# Patient Record
Sex: MALE | Race: WHITE | NOT HISPANIC OR LATINO | Employment: UNEMPLOYED | URBAN - METROPOLITAN AREA
[De-identification: names, ages, dates, MRNs, and addresses within clinical notes are randomized per-mention and may not be internally consistent; named-entity substitution may affect disease eponyms.]

---

## 2021-05-29 ENCOUNTER — RECORDS - HEALTHEAST (OUTPATIENT)
Dept: ADMINISTRATIVE | Facility: CLINIC | Age: 51
End: 2021-05-29

## 2022-12-23 ENCOUNTER — HOSPITAL ENCOUNTER (OUTPATIENT)
Facility: CLINIC | Age: 52
Setting detail: OBSERVATION
Discharge: HOME | End: 2022-12-24
Attending: EMERGENCY MEDICINE | Admitting: EMERGENCY MEDICINE
Payer: COMMERCIAL

## 2022-12-23 DIAGNOSIS — F12.10 CANNABIS ABUSE: ICD-10-CM

## 2022-12-23 DIAGNOSIS — E87.6 HYPOKALEMIA: ICD-10-CM

## 2022-12-23 DIAGNOSIS — J96.01 ACUTE RESPIRATORY FAILURE WITH HYPOXIA (H): ICD-10-CM

## 2022-12-23 DIAGNOSIS — R44.3 HALLUCINATIONS: ICD-10-CM

## 2022-12-23 PROCEDURE — 80179 DRUG ASSAY SALICYLATE: CPT | Performed by: EMERGENCY MEDICINE

## 2022-12-23 PROCEDURE — 85025 COMPLETE CBC W/AUTO DIFF WBC: CPT | Performed by: EMERGENCY MEDICINE

## 2022-12-23 PROCEDURE — 80053 COMPREHEN METABOLIC PANEL: CPT | Performed by: EMERGENCY MEDICINE

## 2022-12-23 PROCEDURE — 99285 EMERGENCY DEPT VISIT HI MDM: CPT | Mod: 25

## 2022-12-23 PROCEDURE — 93005 ELECTROCARDIOGRAM TRACING: CPT | Performed by: EMERGENCY MEDICINE

## 2022-12-23 PROCEDURE — 96365 THER/PROPH/DIAG IV INF INIT: CPT | Mod: 59

## 2022-12-23 PROCEDURE — 36415 COLL VENOUS BLD VENIPUNCTURE: CPT | Performed by: EMERGENCY MEDICINE

## 2022-12-23 PROCEDURE — 82077 ASSAY SPEC XCP UR&BREATH IA: CPT | Performed by: EMERGENCY MEDICINE

## 2022-12-23 PROCEDURE — 80143 DRUG ASSAY ACETAMINOPHEN: CPT | Performed by: EMERGENCY MEDICINE

## 2022-12-24 ENCOUNTER — APPOINTMENT (OUTPATIENT)
Dept: CT IMAGING | Facility: CLINIC | Age: 52
End: 2022-12-24
Attending: EMERGENCY MEDICINE
Payer: COMMERCIAL

## 2022-12-24 ENCOUNTER — APPOINTMENT (OUTPATIENT)
Dept: RADIOLOGY | Facility: CLINIC | Age: 52
End: 2022-12-24
Attending: EMERGENCY MEDICINE
Payer: COMMERCIAL

## 2022-12-24 VITALS
TEMPERATURE: 98 F | BODY MASS INDEX: 35.76 KG/M2 | HEART RATE: 74 BPM | DIASTOLIC BLOOD PRESSURE: 58 MMHG | SYSTOLIC BLOOD PRESSURE: 117 MMHG | RESPIRATION RATE: 18 BRPM | HEIGHT: 70 IN | OXYGEN SATURATION: 94 % | WEIGHT: 249.8 LBS

## 2022-12-24 PROBLEM — R11.2 NAUSEA WITH VOMITING: Status: ACTIVE | Noted: 2022-12-24

## 2022-12-24 PROBLEM — T40.711A: Status: ACTIVE | Noted: 2022-12-24

## 2022-12-24 PROBLEM — F19.10 SUBSTANCE ABUSE (H): Status: ACTIVE | Noted: 2022-12-24

## 2022-12-24 PROBLEM — I10 ESSENTIAL HYPERTENSION: Status: ACTIVE | Noted: 2022-12-24

## 2022-12-24 PROBLEM — F98.8 ADD (ATTENTION DEFICIT DISORDER): Status: ACTIVE | Noted: 2022-12-24

## 2022-12-24 PROBLEM — E11.9 DIABETES MELLITUS, TYPE 2 (H): Status: ACTIVE | Noted: 2022-12-24

## 2022-12-24 PROBLEM — R44.3 HALLUCINATIONS: Status: ACTIVE | Noted: 2022-12-24

## 2022-12-24 PROBLEM — J96.01 ACUTE RESPIRATORY FAILURE WITH HYPOXIA (H): Status: ACTIVE | Noted: 2022-12-24

## 2022-12-24 PROBLEM — D72.829 LEUKOCYTOSIS: Status: ACTIVE | Noted: 2022-12-24

## 2022-12-24 PROBLEM — Z87.09 HISTORY OF COPD: Status: ACTIVE | Noted: 2022-12-24

## 2022-12-24 LAB
ALBUMIN SERPL-MCNC: 3.7 G/DL (ref 3.5–5)
ALBUMIN SERPL-MCNC: 3.8 G/DL (ref 3.5–5)
ALP SERPL-CCNC: 52 U/L (ref 45–120)
ALP SERPL-CCNC: 53 U/L (ref 45–120)
ALT SERPL W P-5'-P-CCNC: 19 U/L (ref 0–45)
ALT SERPL W P-5'-P-CCNC: 20 U/L (ref 0–45)
ANION GAP SERPL CALCULATED.3IONS-SCNC: 11 MMOL/L (ref 5–18)
ANION GAP SERPL CALCULATED.3IONS-SCNC: 17 MMOL/L (ref 5–18)
APAP SERPL-MCNC: <3 UG/ML (ref 10–20)
AST SERPL W P-5'-P-CCNC: 15 U/L (ref 0–40)
AST SERPL W P-5'-P-CCNC: 17 U/L (ref 0–40)
ATRIAL RATE - MUSE: 79 BPM
BASOPHILS # BLD AUTO: 0.1 10E3/UL (ref 0–0.2)
BASOPHILS NFR BLD AUTO: 0 %
BILIRUB SERPL-MCNC: 0.2 MG/DL (ref 0–1)
BILIRUB SERPL-MCNC: 0.3 MG/DL (ref 0–1)
BUN SERPL-MCNC: 8 MG/DL (ref 8–22)
BUN SERPL-MCNC: 8 MG/DL (ref 8–22)
CALCIUM SERPL-MCNC: 8.9 MG/DL (ref 8.5–10.5)
CALCIUM SERPL-MCNC: 9 MG/DL (ref 8.5–10.5)
CHLORIDE BLD-SCNC: 91 MMOL/L (ref 98–107)
CHLORIDE BLD-SCNC: 91 MMOL/L (ref 98–107)
CO2 SERPL-SCNC: 32 MMOL/L (ref 22–31)
CO2 SERPL-SCNC: 36 MMOL/L (ref 22–31)
CREAT SERPL-MCNC: 0.75 MG/DL (ref 0.7–1.3)
CREAT SERPL-MCNC: 0.81 MG/DL (ref 0.7–1.3)
DIASTOLIC BLOOD PRESSURE - MUSE: 57 MMHG
EOSINOPHIL # BLD AUTO: 0.1 10E3/UL (ref 0–0.7)
EOSINOPHIL NFR BLD AUTO: 1 %
ERYTHROCYTE [DISTWIDTH] IN BLOOD BY AUTOMATED COUNT: 14.2 % (ref 10–15)
ERYTHROCYTE [DISTWIDTH] IN BLOOD BY AUTOMATED COUNT: 14.4 % (ref 10–15)
ETHANOL SERPL-MCNC: <10 MG/DL
FLUAV RNA SPEC QL NAA+PROBE: NEGATIVE
FLUBV RNA RESP QL NAA+PROBE: NEGATIVE
GFR SERPL CREATININE-BSD FRML MDRD: >90 ML/MIN/1.73M2
GFR SERPL CREATININE-BSD FRML MDRD: >90 ML/MIN/1.73M2
GLUCOSE BLD-MCNC: 100 MG/DL (ref 70–125)
GLUCOSE BLD-MCNC: 143 MG/DL (ref 70–125)
HCT VFR BLD AUTO: 40.6 % (ref 40–53)
HCT VFR BLD AUTO: 42.8 % (ref 40–53)
HGB BLD-MCNC: 13 G/DL (ref 13.3–17.7)
HGB BLD-MCNC: 13.6 G/DL (ref 13.3–17.7)
IMM GRANULOCYTES # BLD: 0.1 10E3/UL
IMM GRANULOCYTES NFR BLD: 1 %
INTERPRETATION ECG - MUSE: NORMAL
LYMPHOCYTES # BLD AUTO: 3.3 10E3/UL (ref 0.8–5.3)
LYMPHOCYTES NFR BLD AUTO: 27 %
MCH RBC QN AUTO: 28.7 PG (ref 26.5–33)
MCH RBC QN AUTO: 28.8 PG (ref 26.5–33)
MCHC RBC AUTO-ENTMCNC: 31.8 G/DL (ref 31.5–36.5)
MCHC RBC AUTO-ENTMCNC: 32 G/DL (ref 31.5–36.5)
MCV RBC AUTO: 90 FL (ref 78–100)
MCV RBC AUTO: 90 FL (ref 78–100)
MONOCYTES # BLD AUTO: 0.7 10E3/UL (ref 0–1.3)
MONOCYTES NFR BLD AUTO: 6 %
NEUTROPHILS # BLD AUTO: 7.6 10E3/UL (ref 1.6–8.3)
NEUTROPHILS NFR BLD AUTO: 65 %
NRBC # BLD AUTO: 0 10E3/UL
NRBC BLD AUTO-RTO: 0 /100
P AXIS - MUSE: 50 DEGREES
PLATELET # BLD AUTO: 284 10E3/UL (ref 150–450)
PLATELET # BLD AUTO: 307 10E3/UL (ref 150–450)
POTASSIUM BLD-SCNC: 2.8 MMOL/L (ref 3.5–5)
POTASSIUM BLD-SCNC: 3.9 MMOL/L (ref 3.5–5)
PR INTERVAL - MUSE: 148 MS
PROCALCITONIN SERPL-MCNC: 0.02 NG/ML (ref 0–0.49)
PROT SERPL-MCNC: 6.2 G/DL (ref 6–8)
PROT SERPL-MCNC: 6.5 G/DL (ref 6–8)
QRS DURATION - MUSE: 110 MS
QT - MUSE: 424 MS
QTC - MUSE: 486 MS
R AXIS - MUSE: 71 DEGREES
RBC # BLD AUTO: 4.51 10E6/UL (ref 4.4–5.9)
RBC # BLD AUTO: 4.74 10E6/UL (ref 4.4–5.9)
RSV RNA SPEC NAA+PROBE: NEGATIVE
SALICYLATES SERPL-MCNC: <8 MG/DL (ref 2–25)
SARS-COV-2 RNA RESP QL NAA+PROBE: NEGATIVE
SODIUM SERPL-SCNC: 138 MMOL/L (ref 136–145)
SODIUM SERPL-SCNC: 140 MMOL/L (ref 136–145)
SYSTOLIC BLOOD PRESSURE - MUSE: 125 MMHG
T AXIS - MUSE: 80 DEGREES
VENTRICULAR RATE- MUSE: 79 BPM
WBC # BLD AUTO: 11.9 10E3/UL (ref 4–11)
WBC # BLD AUTO: 12.6 10E3/UL (ref 4–11)

## 2022-12-24 PROCEDURE — 71045 X-RAY EXAM CHEST 1 VIEW: CPT

## 2022-12-24 PROCEDURE — 84145 PROCALCITONIN (PCT): CPT | Performed by: STUDENT IN AN ORGANIZED HEALTH CARE EDUCATION/TRAINING PROGRAM

## 2022-12-24 PROCEDURE — 85027 COMPLETE CBC AUTOMATED: CPT | Performed by: STUDENT IN AN ORGANIZED HEALTH CARE EDUCATION/TRAINING PROGRAM

## 2022-12-24 PROCEDURE — 96375 TX/PRO/DX INJ NEW DRUG ADDON: CPT

## 2022-12-24 PROCEDURE — 71275 CT ANGIOGRAPHY CHEST: CPT

## 2022-12-24 PROCEDURE — 250N000011 HC RX IP 250 OP 636: Performed by: EMERGENCY MEDICINE

## 2022-12-24 PROCEDURE — 80053 COMPREHEN METABOLIC PANEL: CPT | Performed by: STUDENT IN AN ORGANIZED HEALTH CARE EDUCATION/TRAINING PROGRAM

## 2022-12-24 PROCEDURE — 250N000011 HC RX IP 250 OP 636: Performed by: STUDENT IN AN ORGANIZED HEALTH CARE EDUCATION/TRAINING PROGRAM

## 2022-12-24 PROCEDURE — 250N000013 HC RX MED GY IP 250 OP 250 PS 637: Performed by: HOSPITALIST

## 2022-12-24 PROCEDURE — 99207 PR APP CREDIT; MD BILLING SHARED VISIT: CPT | Performed by: HOSPITALIST

## 2022-12-24 PROCEDURE — 250N000013 HC RX MED GY IP 250 OP 250 PS 637: Performed by: EMERGENCY MEDICINE

## 2022-12-24 PROCEDURE — G0378 HOSPITAL OBSERVATION PER HR: HCPCS

## 2022-12-24 PROCEDURE — 96366 THER/PROPH/DIAG IV INF ADDON: CPT

## 2022-12-24 PROCEDURE — C9803 HOPD COVID-19 SPEC COLLECT: HCPCS

## 2022-12-24 PROCEDURE — 96361 HYDRATE IV INFUSION ADD-ON: CPT

## 2022-12-24 PROCEDURE — 87637 SARSCOV2&INF A&B&RSV AMP PRB: CPT | Performed by: EMERGENCY MEDICINE

## 2022-12-24 PROCEDURE — 36415 COLL VENOUS BLD VENIPUNCTURE: CPT | Performed by: STUDENT IN AN ORGANIZED HEALTH CARE EDUCATION/TRAINING PROGRAM

## 2022-12-24 PROCEDURE — 258N000003 HC RX IP 258 OP 636: Performed by: STUDENT IN AN ORGANIZED HEALTH CARE EDUCATION/TRAINING PROGRAM

## 2022-12-24 PROCEDURE — 99236 HOSP IP/OBS SAME DATE HI 85: CPT | Performed by: STUDENT IN AN ORGANIZED HEALTH CARE EDUCATION/TRAINING PROGRAM

## 2022-12-24 RX ORDER — NICOTINE POLACRILEX 4 MG
15-30 LOZENGE BUCCAL
Status: DISCONTINUED | OUTPATIENT
Start: 2022-12-24 | End: 2022-12-24 | Stop reason: HOSPADM

## 2022-12-24 RX ORDER — HYDROMORPHONE HCL IN WATER/PF 6 MG/30 ML
0.2 PATIENT CONTROLLED ANALGESIA SYRINGE INTRAVENOUS ONCE
Status: COMPLETED | OUTPATIENT
Start: 2022-12-24 | End: 2022-12-24

## 2022-12-24 RX ORDER — NALOXONE HYDROCHLORIDE 0.4 MG/ML
0.2 INJECTION, SOLUTION INTRAMUSCULAR; INTRAVENOUS; SUBCUTANEOUS
Status: DISCONTINUED | OUTPATIENT
Start: 2022-12-24 | End: 2022-12-24 | Stop reason: HOSPADM

## 2022-12-24 RX ORDER — PROCHLORPERAZINE 25 MG
25 SUPPOSITORY, RECTAL RECTAL EVERY 12 HOURS PRN
Status: DISCONTINUED | OUTPATIENT
Start: 2022-12-24 | End: 2022-12-24 | Stop reason: HOSPADM

## 2022-12-24 RX ORDER — DEXTROAMPHETAMINE SULFATE, DEXTROAMPHETAMINE SACCHARATE, AMPHETAMINE SULFATE AND AMPHETAMINE ASPARTATE 7.5; 7.5; 7.5; 7.5 MG/1; MG/1; MG/1; MG/1
30 CAPSULE, EXTENDED RELEASE ORAL 2 TIMES DAILY
Status: ON HOLD | COMMUNITY
Start: 2022-12-11 | End: 2023-07-13

## 2022-12-24 RX ORDER — FLUTICASONE PROPIONATE AND SALMETEROL 50; 250 UG/1; UG/1
1 POWDER RESPIRATORY (INHALATION) 2 TIMES DAILY PRN
COMMUNITY
Start: 2022-12-05

## 2022-12-24 RX ORDER — LISINOPRIL/HYDROCHLOROTHIAZIDE 10-12.5 MG
1 TABLET ORAL DAILY
Status: DISCONTINUED | OUTPATIENT
Start: 2022-12-24 | End: 2022-12-24 | Stop reason: HOSPADM

## 2022-12-24 RX ORDER — OXYCODONE AND ACETAMINOPHEN 10; 325 MG/1; MG/1
1-2 TABLET ORAL EVERY 8 HOURS PRN
Status: DISCONTINUED | OUTPATIENT
Start: 2022-12-24 | End: 2022-12-24 | Stop reason: HOSPADM

## 2022-12-24 RX ORDER — OXYCODONE AND ACETAMINOPHEN 10; 325 MG/1; MG/1
1 TABLET ORAL EVERY 4 HOURS PRN
Status: ON HOLD | COMMUNITY
Start: 2022-12-10 | End: 2024-04-24

## 2022-12-24 RX ORDER — ONDANSETRON 4 MG/1
4 TABLET, ORALLY DISINTEGRATING ORAL EVERY 6 HOURS PRN
Status: DISCONTINUED | OUTPATIENT
Start: 2022-12-24 | End: 2022-12-24 | Stop reason: HOSPADM

## 2022-12-24 RX ORDER — LISINOPRIL/HYDROCHLOROTHIAZIDE 10-12.5 MG
1 TABLET ORAL DAILY
COMMUNITY
Start: 2022-10-23

## 2022-12-24 RX ORDER — GABAPENTIN 300 MG/1
1200 CAPSULE ORAL 3 TIMES DAILY
COMMUNITY
Start: 2022-12-09

## 2022-12-24 RX ORDER — FLUTICASONE PROPIONATE 50 MCG
2 SPRAY, SUSPENSION (ML) NASAL AT BEDTIME
COMMUNITY
Start: 2022-10-21

## 2022-12-24 RX ORDER — ONDANSETRON 2 MG/ML
4 INJECTION INTRAMUSCULAR; INTRAVENOUS EVERY 6 HOURS PRN
Status: DISCONTINUED | OUTPATIENT
Start: 2022-12-24 | End: 2022-12-24 | Stop reason: HOSPADM

## 2022-12-24 RX ORDER — VENLAFAXINE HYDROCHLORIDE 150 MG/1
150 CAPSULE, EXTENDED RELEASE ORAL AT BEDTIME
Status: DISCONTINUED | OUTPATIENT
Start: 2022-12-24 | End: 2022-12-24 | Stop reason: HOSPADM

## 2022-12-24 RX ORDER — IPRATROPIUM BROMIDE AND ALBUTEROL SULFATE 2.5; .5 MG/3ML; MG/3ML
3 SOLUTION RESPIRATORY (INHALATION) 4 TIMES DAILY PRN
Status: DISCONTINUED | OUTPATIENT
Start: 2022-12-24 | End: 2022-12-24 | Stop reason: HOSPADM

## 2022-12-24 RX ORDER — ATORVASTATIN CALCIUM 20 MG/1
20 TABLET, FILM COATED ORAL
COMMUNITY
Start: 2022-12-09

## 2022-12-24 RX ORDER — ATORVASTATIN CALCIUM 10 MG/1
20 TABLET, FILM COATED ORAL EVERY MORNING
Status: DISCONTINUED | OUTPATIENT
Start: 2022-12-24 | End: 2022-12-24 | Stop reason: HOSPADM

## 2022-12-24 RX ORDER — DEXTROSE MONOHYDRATE 25 G/50ML
25-50 INJECTION, SOLUTION INTRAVENOUS
Status: DISCONTINUED | OUTPATIENT
Start: 2022-12-24 | End: 2022-12-24 | Stop reason: HOSPADM

## 2022-12-24 RX ORDER — INSULIN GLARGINE 100 [IU]/ML
5 INJECTION, SOLUTION SUBCUTANEOUS AT BEDTIME
COMMUNITY
Start: 2022-12-09

## 2022-12-24 RX ORDER — FLUTICASONE PROPIONATE 50 MCG
2 SPRAY, SUSPENSION (ML) NASAL AT BEDTIME
Status: DISCONTINUED | OUTPATIENT
Start: 2022-12-24 | End: 2022-12-24 | Stop reason: HOSPADM

## 2022-12-24 RX ORDER — PROCHLORPERAZINE MALEATE 10 MG
10 TABLET ORAL EVERY 6 HOURS PRN
Status: DISCONTINUED | OUTPATIENT
Start: 2022-12-24 | End: 2022-12-24 | Stop reason: HOSPADM

## 2022-12-24 RX ORDER — DEXTROAMPHETAMINE SACCHARATE, AMPHETAMINE ASPARTATE MONOHYDRATE, DEXTROAMPHETAMINE SULFATE AND AMPHETAMINE SULFATE 2.5; 2.5; 2.5; 2.5 MG/1; MG/1; MG/1; MG/1
30 CAPSULE, EXTENDED RELEASE ORAL 2 TIMES DAILY
Status: DISCONTINUED | OUTPATIENT
Start: 2022-12-24 | End: 2022-12-24 | Stop reason: HOSPADM

## 2022-12-24 RX ORDER — VENLAFAXINE HYDROCHLORIDE 150 MG/1
150 CAPSULE, EXTENDED RELEASE ORAL AT BEDTIME
COMMUNITY
Start: 2022-12-13

## 2022-12-24 RX ORDER — TAMSULOSIN HYDROCHLORIDE 0.4 MG/1
0.8 CAPSULE ORAL
COMMUNITY
Start: 2022-11-04

## 2022-12-24 RX ORDER — LAMOTRIGINE 100 MG/1
100 TABLET ORAL 2 TIMES DAILY
COMMUNITY
Start: 2022-12-13

## 2022-12-24 RX ORDER — BUSPIRONE HYDROCHLORIDE 10 MG/1
10 TABLET ORAL 3 TIMES DAILY
Status: DISCONTINUED | OUTPATIENT
Start: 2022-12-24 | End: 2022-12-24 | Stop reason: HOSPADM

## 2022-12-24 RX ORDER — TAMSULOSIN HYDROCHLORIDE 0.4 MG/1
0.8 CAPSULE ORAL
Status: DISCONTINUED | OUTPATIENT
Start: 2022-12-24 | End: 2022-12-24 | Stop reason: HOSPADM

## 2022-12-24 RX ORDER — ALBUTEROL SULFATE 90 UG/1
1-2 AEROSOL, METERED RESPIRATORY (INHALATION) EVERY 4 HOURS PRN
Status: DISCONTINUED | OUTPATIENT
Start: 2022-12-24 | End: 2022-12-24 | Stop reason: HOSPADM

## 2022-12-24 RX ORDER — FLUTICASONE FUROATE AND VILANTEROL 100; 25 UG/1; UG/1
1 POWDER RESPIRATORY (INHALATION) DAILY
Status: DISCONTINUED | OUTPATIENT
Start: 2022-12-24 | End: 2022-12-24 | Stop reason: HOSPADM

## 2022-12-24 RX ORDER — LAMOTRIGINE 100 MG/1
100 TABLET ORAL 2 TIMES DAILY
Status: DISCONTINUED | OUTPATIENT
Start: 2022-12-24 | End: 2022-12-24 | Stop reason: HOSPADM

## 2022-12-24 RX ORDER — NALOXONE HYDROCHLORIDE 0.4 MG/ML
0.4 INJECTION, SOLUTION INTRAMUSCULAR; INTRAVENOUS; SUBCUTANEOUS
Status: DISCONTINUED | OUTPATIENT
Start: 2022-12-24 | End: 2022-12-24 | Stop reason: HOSPADM

## 2022-12-24 RX ORDER — SODIUM CHLORIDE 9 MG/ML
INJECTION, SOLUTION INTRAVENOUS CONTINUOUS
Status: ACTIVE | OUTPATIENT
Start: 2022-12-24 | End: 2022-12-24

## 2022-12-24 RX ORDER — POTASSIUM CHLORIDE 7.45 MG/ML
10 INJECTION INTRAVENOUS ONCE
Status: COMPLETED | OUTPATIENT
Start: 2022-12-24 | End: 2022-12-24

## 2022-12-24 RX ORDER — ALBUTEROL SULFATE 90 UG/1
1-2 AEROSOL, METERED RESPIRATORY (INHALATION) EVERY 4 HOURS PRN
COMMUNITY
Start: 2022-12-15

## 2022-12-24 RX ORDER — IOPAMIDOL 755 MG/ML
90 INJECTION, SOLUTION INTRAVASCULAR ONCE
Status: COMPLETED | OUTPATIENT
Start: 2022-12-24 | End: 2022-12-24

## 2022-12-24 RX ORDER — BUSPIRONE HYDROCHLORIDE 10 MG/1
10 TABLET ORAL 3 TIMES DAILY
Status: ON HOLD | COMMUNITY
Start: 2022-12-08 | End: 2024-04-23

## 2022-12-24 RX ORDER — POTASSIUM CHLORIDE 1.5 G/1.58G
40 POWDER, FOR SOLUTION ORAL ONCE
Status: COMPLETED | OUTPATIENT
Start: 2022-12-24 | End: 2022-12-24

## 2022-12-24 RX ADMIN — FLUTICASONE FUROATE AND VILANTEROL TRIFENATATE 1 PUFF: 100; 25 POWDER RESPIRATORY (INHALATION) at 09:46

## 2022-12-24 RX ADMIN — POTASSIUM CHLORIDE 10 MEQ: 7.46 INJECTION, SOLUTION INTRAVENOUS at 00:51

## 2022-12-24 RX ADMIN — POTASSIUM CHLORIDE 40 MEQ: 1.5 POWDER, FOR SOLUTION ORAL at 00:51

## 2022-12-24 RX ADMIN — DEXTROAMPHETAMINE SACCHARATE, AMPHETAMINE ASPARTATE MONOHYDRATE, DEXTROAMPHETAMINE SULFATE, AND AMPHETAMINE SULFATE 30 MG: 2.5; 2.5; 2.5; 2.5 CAPSULE, EXTENDED RELEASE ORAL at 09:46

## 2022-12-24 RX ADMIN — LISINOPRIL AND HYDROCHLOROTHIAZIDE 1 TABLET: 12.5; 1 TABLET ORAL at 10:29

## 2022-12-24 RX ADMIN — HYDROMORPHONE HYDROCHLORIDE 0.2 MG: 0.2 INJECTION, SOLUTION INTRAMUSCULAR; INTRAVENOUS; SUBCUTANEOUS at 04:47

## 2022-12-24 RX ADMIN — ATORVASTATIN CALCIUM 20 MG: 10 TABLET, FILM COATED ORAL at 09:46

## 2022-12-24 RX ADMIN — OXYCODONE HYDROCHLORIDE AND ACETAMINOPHEN 1 TABLET: 10; 325 TABLET ORAL at 09:45

## 2022-12-24 RX ADMIN — SODIUM CHLORIDE: 9 INJECTION, SOLUTION INTRAVENOUS at 03:30

## 2022-12-24 RX ADMIN — TAMSULOSIN HYDROCHLORIDE 0.8 MG: 0.4 CAPSULE ORAL at 11:54

## 2022-12-24 RX ADMIN — LAMOTRIGINE 100 MG: 100 TABLET ORAL at 10:29

## 2022-12-24 RX ADMIN — BUSPIRONE HYDROCHLORIDE 10 MG: 10 TABLET ORAL at 09:46

## 2022-12-24 RX ADMIN — IOPAMIDOL 90 ML: 755 INJECTION, SOLUTION INTRAVENOUS at 01:47

## 2022-12-24 ASSESSMENT — ACTIVITIES OF DAILY LIVING (ADL)
ADLS_ACUITY_SCORE: 31
ADLS_ACUITY_SCORE: 35
ADLS_ACUITY_SCORE: 35
ADLS_ACUITY_SCORE: 31

## 2022-12-24 NOTE — DISCHARGE SUMMARY
[facility]  Hospitalist Discharge Summary      Date of Admission:  12/23/2022  Date of Discharge:  12/24/2022  Discharging Provider: Ebenezer Weinstein MD  Discharge Service: Hospitalist Service    Discharge Diagnoses   Acute respiratory failure due to COPD and overdose on edible marijuana     Follow-ups Needed After Discharge   Follow-up Appointments     Follow-up and recommended labs and tests       Follow up with primary care provider, Provider Not In System, within 7   days for hospital follow- up.  No follow up labs or test are needed.             Unresulted Labs Ordered in the Past 30 Days of this Admission     No orders found for last 31 day(s).          Discharge Disposition   Discharged to home  Condition at discharge: Stable    Hospital Course      Rogelio Belle is a 52 year old male admitted on 12/23/2022. He has a past medical history of diabetes and asthma, who is admitted for acute respiratory failure with hypoxia on 2 L nasal cannula in the setting of unintentionally taking 4 times the recommended dose of marijuana edibles.      Acute respiratory failure with hypoxia (H)    Asthma    Work-up in the ER included negative chest x-ray followed by a negative CT PE study.  He does report a history of asthma.  Could also have an additional underlying pulmonary process such as sleep apnea.  Either or both could be exacerbated by his altered mentation in the setting of issue below, the overdose of cannabis.  No signs or symptoms concerning for infection. Does not appear to be on asthma medications (med rec not yet completed) presently. Vitally stable on admit.  -Continued supplemental oxygen and weaned off to RA on day of discharge  -prn duonebs   Was at baseline medical and mental status at the time of discharge.       Cannabis overdose, accidental or unintentional, initial encounter;  Hallucinations    Substance abuse (H)    Hypokalemia, nausea with emesis, Resolve with  "replacement    Leukocytosis    Appears to have been accidental per patient report and story wise.  At least 1 family member did not take, and that is his daughter who is keeping an eye on all the other family members.  Patient no longer has any hallucinations, which he reports as colorations and blobs obscuring his vision-he also noted that \"I can see time.\"  No other symptoms on admission except for the hypoxia above.  He denied having any auditory hallucinations or commands.  Elevated WBC could be from stress demarginalization. Unclear about the potassium, though perhaps he had unrecalled emesis or diarrhea in setting of altered mentation; likely was emesis as this appears to be documented earlier.   -Supportive cares  -Medium rate 75 cc/h for maintenance IV fluids, ordered for 7 hours  -Followed clinically and monitor for recurrent hallucinations  -reviewed safe dosing and if concerns for substance abuse, then would get sw  -potassium replaced    DM II  Not on meds currently  - monitored BG; hypoglycemia protocol   - diabetic diet     Chronic neck pain  Restarted on home dose prn oxycodone 10mg.       Consultations This Hospital Stay   None    Code Status   Full Code    Time Spent on this Encounter   I, Ebenezer Weinsteni MD, personally saw the patient today and spent greater than 30 minutes discharging this patient.       Ebenezer Weinstein MD  [address] [address]  Phone: [phone]  Fax: [phone]  ______________________________________________________________________    Physical Exam   Vital Signs: Temp: 98  F (36.7  C) Temp src: Oral BP: 117/58 Pulse: 74   Resp: 18 SpO2: 94 % O2 Device: Nasal cannula Oxygen Delivery: 2 LPM  Weight: 249 lbs 12.8 oz  Patient is laying down in bed and is alert and oriented and in no acute distress.  He is on room air and likes to go home.  He states he is lucid now and is not confused as before and does " not have any complaints.  HEENT is unremarkable.  Moist oral mucosal moisture  Neck is supple  Chest is clear to auscultation bilaterally.  Good air movement.  No wheezing or retraction.  Heart with regular rate and rhythm  Abdomen is benign and nontender   deferred  Extremities without edema or cyanosis or clubbing  Neurological exam is nonfocal.  Patient is alert and oriented with normal speech and thought process.       Primary Care Physician   Provider Not In System    Discharge Orders      Reason for your hospital stay    Respiratory failure and low oxygen due to overdose on edible pharmaceutical  marijuana     Follow-up and recommended labs and tests     Follow up with primary care provider, Provider Not In System, within 7 days for hospital follow- up.  No follow up labs or test are needed.     Activity    Your activity upon discharge: activity as tolerated     Diet    Follow this diet upon discharge: Orders Placed This Encounter      Moderate Consistent Carb (60 g CHO per Meal) Diet       Significant Results and Procedures   Most Recent 3 CBC's:Recent Labs   Lab Test 12/24/22  0359 12/23/22  2357   WBC 12.6* 11.9*   HGB 13.0* 13.6   MCV 90 90    307     Most Recent 3 BMP's:Recent Labs   Lab Test 12/24/22  0359 12/23/22  2357    140   POTASSIUM 3.9 2.8*   CHLORIDE 91* 91*   CO2 36* 32*   BUN 8 8   CR 0.75 0.81   ANIONGAP 11 17   AKBAR 9.0 8.9    143*     Most Recent 2 LFT's:Recent Labs   Lab Test 12/24/22  0359 12/23/22  2357   AST 15 17   ALT 19 20   ALKPHOS 52 53   BILITOTAL 0.3 0.2     Results for orders placed or performed during the hospital encounter of 12/23/22   XR Chest Port 1 View    Narrative    EXAM: XR CHEST PORT 1 VIEW  LOCATION: [facility]  DATE/TIME: 12/24/2022 12:53 AM    INDICATION: Dyspnea.  COMPARISON: 06/30/2019      Impression    IMPRESSION:     No focal airspace disease. No pleural effusion or pneumothorax.    The cardiomediastinal  silhouette is unremarkable.   CT Chest Pulmonary Embolism w Contrast    Narrative    EXAM: CT CHEST PULMONARY EMBOLISM W CONTRAST  LOCATION: [facility]  DATE/TIME: 12/24/2022 1:59 AM    INDICATION: hypoxia  COMPARISON: 11/27/2019    Impression    IMPRESSION:  1.  No evidence of pulmonary embolus to the segmental level  2.  Bilateral lower lobe Discoid atelectasis     Discharge Medications   Current Discharge Medication List      CONTINUE these medications which have NOT CHANGED    Details   ADDERALL XR 30 MG 24 hr capsule Take 30 mg by mouth 2 times daily Morning and Afternoon      ADVAIR DISKUS 250-50 MCG/ACT inhaler Inhale 1 puff into the lungs 2 times daily      atorvastatin (LIPITOR) 20 MG tablet Take 20 mg by mouth every morning      busPIRone (BUSPAR) 10 MG tablet Take 10 mg by mouth 3 times daily      fluticasone (FLONASE) 50 MCG/ACT nasal spray Spray 2 sprays into both nostrils At Bedtime      gabapentin (NEURONTIN) 300 MG capsule Take 600 mg by mouth 4 times daily      lamoTRIgine (LAMICTAL) 100 MG tablet Take 100 mg by mouth 2 times daily      LANTUS SOLOSTAR 100 UNIT/ML soln Inject 5 Units Subcutaneous At Bedtime      lisinopril-hydrochlorothiazide (ZESTORETIC) 10-12.5 MG tablet Take 1 tablet by mouth daily      metFORMIN (GLUCOPHAGE) 1000 MG tablet Take 1,000 mg by mouth 2 times daily (with meals)      oxyCODONE-acetaminophen (PERCOCET)  MG per tablet Take 1-2 tablets by mouth every 8 hours as needed      tamsulosin (FLOMAX) 0.4 MG capsule Take 0.8 mg by mouth daily (with lunch)      venlafaxine (EFFEXOR XR) 150 MG 24 hr capsule Take 150 mg by mouth At Bedtime      VENTOLIN  (90 Base) MCG/ACT inhaler Inhale 1-2 puffs into the lungs every 4 hours as needed           Allergies   No Known Allergies

## 2022-12-24 NOTE — PROVIDER NOTIFICATION
4:28 AM Paged Dr Bowling in regards to patient chronic neck pain usually taking 10mg of oxycodone. He has ordered a one time dose of IV dilaudid to manage patient pain and will have day shift follow up with the medication reconciliation.

## 2022-12-24 NOTE — PROGRESS NOTES
"PRIMARY DIAGNOSIS: \"GENERIC\" NURSING  OUTPATIENT/OBSERVATION GOALS TO BE MET BEFORE DISCHARGE:  1. ADLs back to baseline: Yes    2. Activity and level of assistance: Up with standby assistance.    3. Pain status: Pain regulated with oral pain medication.     4. Return to near baseline physical activity: Yes     Discharge Planner Nurse   Safe discharge environment identified: Yes  Barriers to discharge: Yes       Entered by: Opal Montelongo RN 12/24/2022 10:41 AM     Please review provider order for any additional goals.   Nurse to notify provider when observation goals have been met and patient is ready for discharge.  "

## 2022-12-24 NOTE — ED TRIAGE NOTES
Pt arrives via EMS. Pt was with family, took edibles. EMS states 105mg, 4x the recommended dose. States some nausea/vomtting. Dizziness. Pt states some hallucinating.

## 2022-12-24 NOTE — PHARMACY-ADMISSION MEDICATION HISTORY
Pharmacy Note - Admission Medication History    Pertinent Provider Information: N/A     ______________________________________________________________________    Prior To Admission (PTA) med list completed and updated in EMR.       PTA Med List   Medication Sig Last Dose     ADDERALL XR 30 MG 24 hr capsule Take 30 mg by mouth 2 times daily Morning and Afternoon 12/23/2022     ADVAIR DISKUS 250-50 MCG/ACT inhaler Inhale 1 puff into the lungs 2 times daily 12/23/2022 at AM, didn't bring     atorvastatin (LIPITOR) 20 MG tablet Take 20 mg by mouth every morning 12/23/2022 at AM     busPIRone (BUSPAR) 10 MG tablet Take 10 mg by mouth 3 times daily 12/23/2022     fluticasone (FLONASE) 50 MCG/ACT nasal spray Spray 2 sprays into both nostrils At Bedtime 12/22/2022 at PM, didn't bring     gabapentin (NEURONTIN) 300 MG capsule Take 600 mg by mouth 4 times daily 12/23/2022     lamoTRIgine (LAMICTAL) 100 MG tablet Take 100 mg by mouth 2 times daily 12/23/2022 at AM     LANTUS SOLOSTAR 100 UNIT/ML soln Inject 5 Units Subcutaneous At Bedtime 12/22/2022 at PM     lisinopril-hydrochlorothiazide (ZESTORETIC) 10-12.5 MG tablet Take 1 tablet by mouth daily 12/23/2022 at AM     metFORMIN (GLUCOPHAGE) 1000 MG tablet Take 1,000 mg by mouth 2 times daily (with meals) 12/23/2022     oxyCODONE-acetaminophen (PERCOCET)  MG per tablet Take 1-2 tablets by mouth every 8 hours as needed 12/23/2022     tamsulosin (FLOMAX) 0.4 MG capsule Take 0.8 mg by mouth daily (with lunch) 12/23/2022 at 1200     venlafaxine (EFFEXOR XR) 150 MG 24 hr capsule Take 150 mg by mouth At Bedtime 12/22/2022 at PM     VENTOLIN  (90 Base) MCG/ACT inhaler Inhale 1-2 puffs into the lungs every 4 hours as needed Unknown at didn't bring       Information source(s): Patient and CareEverywhere/SureScripts  Method of interview communication: in-person    Summary of Changes to PTA Med List  New: all listed  Discontinued: N/A  Changed: N/A    Patient was asked about  OTC/herbal products specifically.  PTA med list reflects this.    In the past week, patient estimated taking medication this percent of the time:  greater than 90%.    Allergies were reviewed, assessed, and updated with the patient.      Patient did not bring any medications to the hospital and can't retrieve from home. No multi-dose medications are available for use during hospital stay.     The information provided in this note is only as accurate as the sources available at the time of the update(s).    Thank you for the opportunity to participate in the care of this patient.    Nolan Rodriguez [facility]  12/24/2022 8:11 AM

## 2022-12-24 NOTE — ED PROVIDER NOTES
EMERGENCY DEPARTMENT ENCOUNTER      NAME: Rogelio Belle  AGE: 52 year old male  YOB: 1970  MRN: [id number]  EVALUATION DATE & TIME: 2022 11:27 PM    PCP: System, Provider Not In    ED PROVIDER: Becki Yanes MD    Chief Complaint   Patient presents with     Ingestion         FINAL IMPRESSION:  1. Acute respiratory failure with hypoxia (H)    2. Cannabis abuse    3. Hallucinations          ED COURSE & MEDICAL DECISION MAKIN:30 PM I met with the patient to gather history and to perform my initial exam. We discussed plans for the ED course, including diagnostic testing and treatment.      Pertinent Labs & Imaging studies reviewed. (See chart for details)    52 year old male with history of COPD, DM who presents to the Emergency Department for evaluation of altered mental status, dizziness with some nausea and vomiting after eating excessive amount of cannabis edibles at a party.  On exam appears to be hallucinating.  Concern for COVID ingestion.  Interestingly he is hypoxic to 88% on room air.  He is not typically on oxygen at home.  No respiratory distress, speaking in full sentences and lungs are clear.  He does have a history of COPD.  This may be some baseline hypoxia, compounded with his obesity, possible sleep apnea, and overdose.  He denies having any aspiration, but with his ingestion is not a good historian will obtain a chest x-ray to evaluate for potential aspiration given emesis.  Symptomatic anemia, arrhythmia, pulmonary edema on the differential.  My concern for ACS, PE is low.    Patient placed on monitor, supplemental oxygen, IV established and blood obtained.  Twelve-lead EKG shows sinus rhythm without ischemic changes.   CBC, CMP, aspirin, Tylenol, blood alcohol level, COVID/influenza/RSV swab and CXR pending at the time of dictation.  Patient signed out to Dr. Magaña awaiting results of same.            Medical Decision Making    History:    Supplemental history from:  Documented in HPI, if applicable    External Record(s) reviewed: Documented in HPI, if applicable.    Work Up:    Chart documentation includes differential considered and any EKGs or imaging independently interpreted by provider.    In additional to work up documented, I considered the following work up: See chart documentation, if applicable.    External consultation:    Discussion of management with another provider: See chart documentation, if applicable    Complicating factors:    Care impacted by chronic illness: Chronic Lung Disease and Diabetes    Care affected by social determinants of health: Alcohol Abuse and/or Recreational Drug Use    Disposition considerations: Admission considered. Patient was signed out to the oncoming physician, disposition pending.        At the conclusion of the encounter I discussed the results of all of the tests and the disposition. The questions were answered. The patient or family acknowledged understanding and was agreeable with the care plan.    CRITICAL CARE:  Critical Care  Performed by: Becki Yanes MD  Authorized by: Becki Yanes MD     Total critical care time: 35 minutes  Criticalcare time was exclusive of separately billable procedures and treating other patients.  Critical care was necessary to treat or prevent imminent or life-threatening deterioration of the following conditions: Acute hypoxic respiratory failure  Critical care was time spent personally by me on the following activities: development of treatment plan with patient or surrogate, discussions with consultants, examination of patient, evaluation of patient's response totreatment, obtaining history from patient or surrogate, ordering and performing treatments and interventions, ordering and review of laboratory studies, ordering and review of radiographic studies and re-evaluation ofpatient's condition, this excludes any separately billable procedures.      MEDICATIONS GIVEN IN THE  "EMERGENCY:  Medications - No data to display    NEW PRESCRIPTIONS STARTED AT TODAY'S ER VISIT  New Prescriptions    No medications on file          =================================================================    HPI    HPI is severely limited due to patient intoxication.    Patient information was obtained from: ED Nurse and Patient     Use of Intrepreter: N/A     Rogelio Belle is a 52 year old male with no pertinent medical history on file who presents to the ED for evaluation of intoxication.    Per EMS, patient arrives via after taking marijuana edibles with his family. EMS reported the patient took approximately 105mg, 4x the recommended dose.     Patient endorses taking one marijuana edible prior to arrival and states \"I'm here because I got really fucked up.\" He endorses hallucinations, vomiting, light-headedness, and dizziness, but denies any nausea. He denies smoking, shooting, or snorting any drugs. He denies normally being on any supplemental oxygen. He endorses a PMH of DM and taking pills and Insulin for his DM daily. He endorses taking other medications daily as well, but notes that he currently cannot remember which medications he takes.     REVIEW OF SYSTEMS  ROS is limited due to patient intoxication.      PAST MEDICAL HISTORY:  Past Medical History:   Diagnosis Date     COPD (chronic obstructive pulmonary disease) (H)      Diabetes (H)        PAST SURGICAL HISTORY:  History reviewed. No pertinent surgical history.    CURRENT MEDICATIONS:    None       ALLERGIES:  No Known Allergies    FAMILY HISTORY:  No family history on file.    SOCIAL HISTORY:        VITALS:  Patient Vitals for the past 24 hrs:   BP Temp Temp src Pulse Resp SpO2 Height Weight   12/23/22 2329 -- -- -- 85 -- 91 % -- --   12/23/22 2328 125/57 97.6  F (36.4  C) Oral 85 16 (!) 88 % 1.778 m (5' 10\") 129.7 kg (286 lb)       PHYSICAL EXAM    General Appearance: Patient is alert and knows he is in a hospital, but he does not know " which hospital he is in. Well-appearing, well-nourished  Head:  Normocephalic, atraumatic  Eyes:  PERRL, conjunctiva/corneas clear, EOM's intact  ENT:   membranes are moist without pallor  Neck:  Supple  Chest:  No tenderness or deformity, no crepitus  Cardio:  Regular rate and rhythm, no murmur/gallop/rub, 2+ pulses symmetric in all extremities  Pulm:  Patient is hypoxic to 88% on room air, but no respiratory distress. Clear to auscultation bilaterally  Back:  No CVA tenderness, normal ROM  Abdomen:  Soft, non-tender, non distended,no rebound or guarding.  Obese  Extremities: Moves all extremities normally, normal gait.  No peripheral edema  Skin:  Skin warm, dry, no rashes  Neuro:  Alert and oriented ×3, moving all extremities, no gross sensory defects  Psychologic: Patient is actively hallucinating and looking around the room.       RADIOLOGY/LABS:  Reviewed all pertinent imaging. Please see official radiology report. All pertinent labs reviewed and interpreted.         EKG:  Sinus rhythm rate 79.  No notable ST or T wave abnormalities.  Normal intervals with QRS of 110, QTc 486.  No previous EKG with which to compare.  I have independently reviewed and interpreted the EKG(s) documented above.      The creation of this record is based on the scribe s observations of the work being performed by Becki Yanes MD and the provider s statements to them. It was created on her behalf by Sumit Beckett, a trained medical scribe. This document has been checked and approved by the attending provider.    Becki Yanes MD  Emergency Medicine  [facility] EMERGENCY ROOM  [address] [id number] [phone]  Dept: [phone]       Becki Yanes MD  12/24/22 0020

## 2022-12-24 NOTE — PROGRESS NOTES
Patient has been assessed for Home Oxygen needs.     Pulse oximetry (SpO2) and Oxygen flow readings:    SpO2 = 91% on room air at rest while awake.    SpO2 = 88% on room air during activity/with exercise.      Pt able to walk and talk with nurse during entire 6 minute walk test. No sign of SHINE or tachypnea. No need for O2 during test as pt has history of COPD.

## 2022-12-24 NOTE — PROGRESS NOTES
Pt discharged. AVS education and instructions reviewed with the patient. All questions were answered and pt verbalized understanding. Iv's removed. Pt's belongings gathered and pt is to take them home. His wife is to take him home.

## 2022-12-24 NOTE — PLAN OF CARE
"Goal Outcome Evaluation:         Problem: Plan of Care - These are the overarching goals to be used throughout the patient stay.    Goal: Plan of Care Review  Description: The Plan of Care Review/Shift note should be completed every shift.  The Outcome Evaluation is a brief statement about your assessment that the patient is improving, declining, or no change.  This information will be displayed automatically on your shift note.  12/24/2022 1209 by Opal Montelongo RN  Outcome: Adequate for Care Transition  12/24/2022 1209 by Opal Montelongo RN  Outcome: Adequate for Care Transition  Goal: Patient-Specific Goal (Individualized)  Description: You can add care plan individualizations to a care plan. Examples of Individualization might be:  \"Parent requests to be called daily at 9am for status\", \"I have a hard time hearing out of my right ear\", or \"Do not touch me to wake me up as it startles me\".  12/24/2022 1209 by Opal Montelongo RN  Outcome: Adequate for Care Transition  12/24/2022 1209 by Opal Montelongo RN  Outcome: Adequate for Care Transition  Goal: Absence of Hospital-Acquired Illness or Injury  12/24/2022 1209 by Opal Montelongo RN  Outcome: Adequate for Care Transition  12/24/2022 1209 by Opal Montelongo RN  Outcome: Adequate for Care Transition  Intervention: Identify and Manage Fall Risk  Recent Flowsheet Documentation  Taken 12/24/2022 1000 by Opal Montelongo RN  Safety Promotion/Fall Prevention:   activity supervised   assistive device/personal items within reach   bed alarm on   increased rounding and observation   increase visualization of patient   nonskid shoes/slippers when out of bed   patient and family education   room near nurse's station   room door open   room organization consistent   safety round/check completed  Goal: Optimal Comfort and Wellbeing  12/24/2022 1209 by Opal Montelongo RN  Outcome: Adequate for Care Transition  12/24/2022 1209 by Opal Montelongo RN  Outcome: Adequate for Care " Transition  Intervention: Monitor Pain and Promote Comfort  Recent Flowsheet Documentation  Taken 12/24/2022 0945 by Opal Montelongo, RN  Pain Management Interventions:   medication (see MAR)   heat applied  Goal: Readiness for Transition of Care  12/24/2022 1209 by Opal Montelongo, RN  Outcome: Adequate for Care Transition  12/24/2022 1209 by Opal Montelongo, RN  Outcome: Adequate for Care Transition

## 2022-12-24 NOTE — ED NOTES
"EMERGENCY DEPARTMENT SIGN OUT NOTE        ED COURSE AND MEDICAL DECISION MAKING  Patient was signed out to me by Dr Becki Yanes at 12:30 AM.  12:34 AM I spoke with the lab about critical results.  2:43 AM I spoke with Dr. Bowling, the accepting hospitalist.    In brief, Rogelio Belle is a 52 year old male who initially presented for evaluation of intoxication.     Per EMS, patient arrives via EMS after taking marijuana edibles with his family. EMS reported the patient took approximately 105mg, 4x the recommended dose.      Patient endorses taking one marijuana edible prior to arrival and states \"I'm here because I got really fucked up.\" He endorses hallucinations, vomiting, light-headedness, and dizziness, but denies any nausea. He denies smoking, shooting, or snorting any drugs. He denies normally being on any supplemental oxygen. He endorses a PMH of DM and taking pills and Insulin for his DM daily. He endorses taking other medications daily as well, but notes that he currently cannot remember which medications he takes.      At time of sign out, disposition was pending x-ray and reevaluation  Patient's x-ray is clear.  Continues to have some desaturation.  Given this did get a CT scan of the chest.  No obvious cause of his hypoxia seen.  Continues to require couple liters.  Likely multifactorial.  Given his continued hypoxia will bring him in under observation status.  Discussed with the hospitalist.    FINAL IMPRESSION    1. Acute respiratory failure with hypoxia (H)    2. Cannabis abuse    3. Hallucinations        ED MEDS  Medications - No data to display    LAB  Labs Ordered and Resulted from Time of ED Arrival to Time of ED Departure - No data to display    EKG      RADIOLOGY    XR Chest 2 Views    (Results Pending)       DISCHARGE MEDS  New Prescriptions    No medications on file       Arnav Magaña M.D.   [facility] EMERGENCY ROOM  [address] " [id number]  [phone]     Arnav Magaña MD  12/24/22 0448

## 2022-12-24 NOTE — H&P
"[facility]    History and Physical - Hospitalist Service       Date of Admission:  12/23/2022    Assessment & Plan      Rogelio Belle is a 52 year old male admitted on 12/23/2022. He has a past medical history of diabetes and asthma, who is admitted for acute respiratory failure with hypoxia on 2 L nasal cannula in the setting of unintentionally taking 4 times the recommended dose of marijuana edibles.      Acute respiratory failure with hypoxia (H)    Asthma    Assessment: Work-up in the ER included negative chest x-ray followed by a negative CT PE study.  He does report a history of asthma.  Could also have an additional underlying pulmonary process such as sleep apnea.  Either or both could be exacerbated by his altered mentation in the setting of issue below, the overdose of cannabis.  No signs or symptoms concerning for infection. Does not appear to be on asthma medications (med rec not yet completed) presently. Vitally stable on admit.    Plan:   -Conception to observation  -Continue supplemental oxygen and wean as able  -Procalcitonin added on  -Could try an ambulation trial if further clinically improves  -prn anand    Cannabis overdose, accidental or unintentional, initial encounter;  Hallucinations    Substance abuse (H)    Hypokalemia, nausea with emesis  Leukocytosis    Assessment: Appears to have been accidental per patient report and story wise.  At least 1 family member did not take, and that is his daughter who is keeping an eye on all the other family members.  Patient no longer has any hallucinations, which he reports as colorations and blobs obscuring his vision-he also noted that \"I can see time.\"  No other symptoms on admission except for the hypoxia above.  He denied having any auditory hallucinations or commands.  Elevated WBC could be from stress demarginalization. Unclear about the potassium, though perhaps he had unrecalled emesis or diarrhea in setting of " "altered mentation; likely was emesis as this appears to be documented earlier.     Plan:  -Supportive cares  -Medium rate 75 cc/h for maintenance IV fluids, ordered for 7 hours  -Follow clinically and monitor for recurrent hallucinations  -reviewed safe dosing and if concerns for substance abuse, then would get sw  -potassium replacement.    DM  A- not on current meds  P:  - monitor; hypoglycemia protocol   - diabetic diet     Chronic neck pain  A- later paged about chronic neck pain. Pt told RN he has prn oxycodone 10mg.  However, medication reconciliation has not yet been completed.  As such, we will provide a x1 low-dose medication now and pending further info after med rec  P  - x1 low-dose dilaudid now  - appreciate pharmacy help later today       Diet: Moderate Consistent Carb (60 g CHO per Meal) Diet diabetic  DVT Prophylaxis: Pneumatic Compression Devices  Scott Catheter: Not present  Central Lines: None  Cardiac Monitoring: None  Code Status: Full Code FULL     Clinically Significant Risk Factors Present on Admission         # Hypokalemia: Lowest K = 2.8 mmol/L in last 2 days, will replace as needed                # Severe Obesity: Estimated body mass index is 41.04 kg/m  as calculated from the following:    Height as of this encounter: 1.778 m (5' 10\").    Weight as of this encounter: 129.7 kg (286 lb).           Disposition Plan      Expected Discharge Date: 12/25/2022                The patient's care was discussed with the Patient and ER Team.    Zack Bowling MD  Hospitalist Service  [facility]  Securely message with the [employer] Web Console (learn more here)  Text page via [employer] Paging/Directory         ______________________________________________________________________    Chief Complaint   I took too much marijuana    History is obtained from the patient and ER provider    History of Present Illness   Rogelio Belle is a 52 year old male who has a past medical history of " "diabetes and asthma, who presents to the ER after sustaining hallucinations from taking too much marijuana unintentionally.    Evening of admit, the patient and his family decided to take edible marijuana.  He unintentionally took 4 times the recommended dose.  He thereafter subsequently developed hallucinations, which he describes as primarily colorations and blobs of color, and that he could \"see time\" but on admission  No longer with any hallucinations.  He did not have any auditory or command hallucinations.  He confirmed this was unintentional and not with any intent to harm self.  He also reports having no other symptoms.  We discussed his hypoxia; he states a history of asthma. No other symptoms occluding fevers, chills, or pain anywhere or any change in his bowel movements or urination.    He continues to still smoke 1 pack/day of tobacco, states he no longer consumes alcohol, \"I quit many years ago,\" and notes that the only substance that he uses is the marijuana.  He is full code.  Lives with his wife, and his daughter and her .  He reports that his daughter did not take the edible, and she is keeping an eye on the other family members.     Review of Systems    The 10 point Review of Systems is negative other than noted in the HPI or here.      Past Medical History    I have reviewed this patient's medical history and updated it with pertinent information if needed.   Past Medical History:   Diagnosis Date     COPD (chronic obstructive pulmonary disease) (H)      Diabetes (H)        Past Surgical History   I have reviewed this patient's surgical history and updated it with pertinent information if needed.  History reviewed. No pertinent surgical history.    Social History   I have reviewed this patient's social history and updated it with pertinent information if needed.  Social History     Tobacco Use     Smoking status: Every Day     Packs/day: 1.00     Types: Cigarettes   Substance Use Topics     " Alcohol use: Not Currently       Family History   Noncontributory     Prior to Admission Medications   None     Allergies   No Known Allergies    Physical Exam   Vital Signs: Temp: 97.6  F (36.4  C) Temp src: Oral BP: 118/58 Pulse: 78   Resp: 23 SpO2: 92 % O2 Device: Nasal cannula Oxygen Delivery: 2 LPM  Weight: 286 lbs 0 oz     GENERAL:  Alert, appears comfortable, in no acute distress, appears stated age   HEAD:  Normocephalic, without obvious abnormality, atraumatic   EYES:  PERRL, conjunctiva/corneas clear, no scleral icterus, EOM's intact   NOSE: Nares normal, septum midline, mucosa normal, no drainage   THROAT: Lips, mucosa, and tongue normal; teeth and gums normal, mouth moist   NECK: Supple, symmetrical, trachea midline   BACK:   Symmetric, no curvature, ROM normal   LUNGS:   Clear to auscultation bilaterally, no rales, rhonchi, or wheezing, symmetric chest rise on inhalation, respirations unlabored, on 2L NC   CHEST WALL:  No tenderness or conspicuous deformity   HEART:  Regular rate and rhythm, S1 and S2 normal, no murmur, rub, or gallop, no conspicuous jugular venous distention noted, peripheral pulses intact    ABDOMEN:   Soft, non-tender, bowel sounds auscultated in all four quadrants, no masses, no organomegaly, no rebound or guarding   EXTREMITIES: Extremities normal, atraumatic, no cyanosis or edema    SKIN: Dry to touch, no exanthems in the visualized areas   NEURO: Alert, oriented x3, moves all four extremities of their own volition    PSYCH: Cooperative and behavior is appropriate; he reports hallucinations earlier       Data   Data reviewed today: I reviewed all medications, new labs and imaging results over the last 24 hours. I personally reviewed the EKG tracing showing sr, prolonged qtc.    Recent Labs   Lab 12/23/22  2357   WBC 11.9*   HGB 13.6   MCV 90         POTASSIUM 2.8*   CHLORIDE 91*   CO2 32*   BUN 8   CR 0.81   ANIONGAP 17   AKBAR 8.9   *   ALBUMIN 3.8   PROTTOTAL  6.5   BILITOTAL 0.2   ALKPHOS 53   ALT 20   AST 17     Recent Results (from the past 24 hour(s))   XR Chest Port 1 View    Narrative    EXAM: XR CHEST PORT 1 VIEW  LOCATION: [facility]  DATE/TIME: 12/24/2022 12:53 AM    INDICATION: Dyspnea.  COMPARISON: 06/30/2019      Impression    IMPRESSION:     No focal airspace disease. No pleural effusion or pneumothorax.    The cardiomediastinal silhouette is unremarkable.   CT Chest Pulmonary Embolism w Contrast    Narrative    EXAM: CT CHEST PULMONARY EMBOLISM W CONTRAST  LOCATION: [facility]  DATE/TIME: 12/24/2022 1:59 AM    INDICATION: hypoxia  COMPARISON: 11/27/2019  TECHNIQUE: CT chest pulmonary angiogram during arterial phase injection of IV contrast. Multiplanar reformats and MIP reconstructions were performed. Dose reduction techniques were used.   CONTRAST: Isovue 370 90ml    FINDINGS:  ANGIOGRAM CHEST: Pulmonary arteries are normal caliber and negative for pulmonary emboli. Thoracic aorta is negative for dissection. No CT evidence of right heart strain.    LUNGS AND PLEURA: Discoid bibasilar atelectasis. No pleural effusion.    MEDIASTINUM/AXILLAE: No lymphadenopathy. Normal esophagus. No significant pericardial effusion.    CORONARY ARTERY CALCIFICATION: Mild.    UPPER ABDOMEN: Unremarkable    MUSCULOSKELETAL: No concerning bone lesions.      Impression    IMPRESSION:  1.  No evidence of pulmonary embolus to the segmental level  2.  Bilateral lower lobe Discoid atelectasis

## 2022-12-24 NOTE — PLAN OF CARE
"PRIMARY DIAGNOSIS: \"GENERIC\" NURSING  OUTPATIENT/OBSERVATION GOALS TO BE MET BEFORE DISCHARGE:  ADLs back to baseline: Yes    Activity and level of assistance: Up with standby assistance.    Pain status: Improved but still requiring IV narcotics.    Return to near baseline physical activity: Yes     Discharge Planner Nurse   Safe discharge environment identified: Yes  Barriers to discharge: No - potassium replacement       Entered by: Yuly Cohen RN 12/24/2022 4:44 AM     Please review provider order for any additional goals.   Nurse to notify provider when observation goals have been met and patient is ready for discharge.Goal Outcome Evaluation:                        "

## 2022-12-24 NOTE — PROGRESS NOTES
"PRIMARY DIAGNOSIS: \"GENERIC\" NURSING  OUTPATIENT/OBSERVATION GOALS TO BE MET BEFORE DISCHARGE:  1. ADLs back to baseline: Yes    2. Activity and level of assistance: Up with standby assistance.    3. Pain status: Improved-controlled with oral pain medications.    4. Return to near baseline physical activity: Yes     Discharge Planner Nurse   Safe discharge environment identified: Yes  Barriers to discharge: No       Entered by: Opal Montelongo RN 12/24/2022 12:38 PM     Please review provider order for any additional goals.   Nurse to notify provider when observation goals have been met and patient is ready for discharge.  "

## 2023-02-05 ENCOUNTER — HEALTH MAINTENANCE LETTER (OUTPATIENT)
Age: 53
End: 2023-02-05

## 2023-05-14 ENCOUNTER — HEALTH MAINTENANCE LETTER (OUTPATIENT)
Age: 53
End: 2023-05-14

## 2023-07-13 ENCOUNTER — APPOINTMENT (OUTPATIENT)
Dept: CT IMAGING | Facility: CLINIC | Age: 53
DRG: 190 | End: 2023-07-13
Attending: EMERGENCY MEDICINE
Payer: COMMERCIAL

## 2023-07-13 ENCOUNTER — HOSPITAL ENCOUNTER (INPATIENT)
Facility: CLINIC | Age: 53
LOS: 1 days | Discharge: LEFT BEFORE DISCHARGE | DRG: 190 | End: 2023-07-13
Attending: EMERGENCY MEDICINE | Admitting: INTERNAL MEDICINE
Payer: COMMERCIAL

## 2023-07-13 ENCOUNTER — APPOINTMENT (OUTPATIENT)
Dept: RADIOLOGY | Facility: CLINIC | Age: 53
DRG: 190 | End: 2023-07-13
Attending: EMERGENCY MEDICINE
Payer: COMMERCIAL

## 2023-07-13 VITALS
BODY MASS INDEX: 37.54 KG/M2 | RESPIRATION RATE: 18 BRPM | HEART RATE: 74 BPM | WEIGHT: 262.2 LBS | SYSTOLIC BLOOD PRESSURE: 121 MMHG | DIASTOLIC BLOOD PRESSURE: 57 MMHG | OXYGEN SATURATION: 94 % | TEMPERATURE: 98.5 F | HEIGHT: 70 IN

## 2023-07-13 DIAGNOSIS — E87.6 HYPOKALEMIA: ICD-10-CM

## 2023-07-13 DIAGNOSIS — J15.9 COMMUNITY ACQUIRED BACTERIAL PNEUMONIA: ICD-10-CM

## 2023-07-13 DIAGNOSIS — J44.1 COPD WITH ACUTE EXACERBATION (H): ICD-10-CM

## 2023-07-13 DIAGNOSIS — E83.42 HYPOMAGNESEMIA: ICD-10-CM

## 2023-07-13 LAB
ALBUMIN SERPL-MCNC: 3.1 G/DL (ref 3.5–5)
ALBUMIN SERPL-MCNC: 3.3 G/DL (ref 3.5–5)
ALBUMIN UR-MCNC: 10 MG/DL
ALP SERPL-CCNC: 56 U/L (ref 45–120)
ALP SERPL-CCNC: 58 U/L (ref 45–120)
ALT SERPL W P-5'-P-CCNC: 16 U/L (ref 0–45)
ALT SERPL W P-5'-P-CCNC: 18 U/L (ref 0–45)
ANION GAP SERPL CALCULATED.3IONS-SCNC: 13 MMOL/L (ref 5–18)
ANION GAP SERPL CALCULATED.3IONS-SCNC: 15 MMOL/L (ref 5–18)
APPEARANCE UR: CLEAR
AST SERPL W P-5'-P-CCNC: 25 U/L (ref 0–40)
AST SERPL W P-5'-P-CCNC: 25 U/L (ref 0–40)
ATRIAL RATE - MUSE: 86 BPM
BASE EXCESS BLDV CALC-SCNC: 2.8 MMOL/L
BASOPHILS # BLD AUTO: 0 10E3/UL (ref 0–0.2)
BASOPHILS # BLD AUTO: 0 10E3/UL (ref 0–0.2)
BASOPHILS NFR BLD AUTO: 0 %
BASOPHILS NFR BLD AUTO: 0 %
BILIRUB SERPL-MCNC: 0.5 MG/DL (ref 0–1)
BILIRUB SERPL-MCNC: 0.7 MG/DL (ref 0–1)
BILIRUB UR QL STRIP: NEGATIVE
BNP SERPL-MCNC: <10 PG/ML (ref 0–43)
BUN SERPL-MCNC: 13 MG/DL (ref 8–22)
BUN SERPL-MCNC: 13 MG/DL (ref 8–22)
CALCIUM SERPL-MCNC: 7.8 MG/DL (ref 8.5–10.5)
CALCIUM SERPL-MCNC: 8 MG/DL (ref 8.5–10.5)
CHLORIDE BLD-SCNC: 84 MMOL/L (ref 98–107)
CHLORIDE BLD-SCNC: 89 MMOL/L (ref 98–107)
CO2 SERPL-SCNC: 27 MMOL/L (ref 22–31)
CO2 SERPL-SCNC: 30 MMOL/L (ref 22–31)
COLOR UR AUTO: ABNORMAL
CREAT SERPL-MCNC: 0.77 MG/DL (ref 0.7–1.3)
CREAT SERPL-MCNC: 0.79 MG/DL (ref 0.7–1.3)
DIASTOLIC BLOOD PRESSURE - MUSE: NORMAL MMHG
EOSINOPHIL # BLD AUTO: 0 10E3/UL (ref 0–0.7)
EOSINOPHIL # BLD AUTO: 0.2 10E3/UL (ref 0–0.7)
EOSINOPHIL NFR BLD AUTO: 0 %
EOSINOPHIL NFR BLD AUTO: 3 %
ERYTHROCYTE [DISTWIDTH] IN BLOOD BY AUTOMATED COUNT: 13.9 % (ref 10–15)
ERYTHROCYTE [DISTWIDTH] IN BLOOD BY AUTOMATED COUNT: 14.1 % (ref 10–15)
GFR SERPL CREATININE-BSD FRML MDRD: >90 ML/MIN/1.73M2
GFR SERPL CREATININE-BSD FRML MDRD: >90 ML/MIN/1.73M2
GLUCOSE BLD-MCNC: 126 MG/DL (ref 70–125)
GLUCOSE BLD-MCNC: 171 MG/DL (ref 70–125)
GLUCOSE BLDC GLUCOMTR-MCNC: 169 MG/DL (ref 70–99)
GLUCOSE BLDC GLUCOMTR-MCNC: 188 MG/DL (ref 70–99)
GLUCOSE BLDC GLUCOMTR-MCNC: 278 MG/DL (ref 70–99)
GLUCOSE UR STRIP-MCNC: 150 MG/DL
HCO3 BLDV-SCNC: 28 MMOL/L (ref 24–30)
HCT VFR BLD AUTO: 35 % (ref 40–53)
HCT VFR BLD AUTO: 37 % (ref 40–53)
HGB BLD-MCNC: 11.9 G/DL (ref 13.3–17.7)
HGB BLD-MCNC: 12.7 G/DL (ref 13.3–17.7)
HGB UR QL STRIP: NEGATIVE
HOLD SPECIMEN: NORMAL
IMM GRANULOCYTES # BLD: 0 10E3/UL
IMM GRANULOCYTES # BLD: 0.1 10E3/UL
IMM GRANULOCYTES NFR BLD: 0 %
IMM GRANULOCYTES NFR BLD: 1 %
INTERPRETATION ECG - MUSE: NORMAL
KETONES UR STRIP-MCNC: NEGATIVE MG/DL
L PNEUMO1 AG UR QL IA: NEGATIVE
LACTATE SERPL-SCNC: 2.7 MMOL/L (ref 0.7–2)
LACTATE SERPL-SCNC: 2.9 MMOL/L (ref 0.7–2)
LEUKOCYTE ESTERASE UR QL STRIP: NEGATIVE
LYMPHOCYTES # BLD AUTO: 0.4 10E3/UL (ref 0.8–5.3)
LYMPHOCYTES # BLD AUTO: 1.2 10E3/UL (ref 0.8–5.3)
LYMPHOCYTES NFR BLD AUTO: 13 %
LYMPHOCYTES NFR BLD AUTO: 6 %
MAGNESIUM SERPL-MCNC: 0.8 MG/DL (ref 1.8–2.6)
MAGNESIUM SERPL-MCNC: 2.2 MG/DL (ref 1.8–2.6)
MCH RBC QN AUTO: 29 PG (ref 26.5–33)
MCH RBC QN AUTO: 29.3 PG (ref 26.5–33)
MCHC RBC AUTO-ENTMCNC: 34 G/DL (ref 31.5–36.5)
MCHC RBC AUTO-ENTMCNC: 34.3 G/DL (ref 31.5–36.5)
MCV RBC AUTO: 85 FL (ref 78–100)
MCV RBC AUTO: 85 FL (ref 78–100)
MONOCYTES # BLD AUTO: 0.3 10E3/UL (ref 0–1.3)
MONOCYTES # BLD AUTO: 1.5 10E3/UL (ref 0–1.3)
MONOCYTES NFR BLD AUTO: 16 %
MONOCYTES NFR BLD AUTO: 4 %
NEUTROPHILS # BLD AUTO: 6.3 10E3/UL (ref 1.6–8.3)
NEUTROPHILS # BLD AUTO: 6.4 10E3/UL (ref 1.6–8.3)
NEUTROPHILS NFR BLD AUTO: 71 %
NEUTROPHILS NFR BLD AUTO: 86 %
NITRATE UR QL: NEGATIVE
NRBC # BLD AUTO: 0 10E3/UL
NRBC # BLD AUTO: 0 10E3/UL
NRBC BLD AUTO-RTO: 0 /100
NRBC BLD AUTO-RTO: 0 /100
OSMOLALITY SERPL: 265 MMOL/KG (ref 275–295)
OSMOLALITY UR: 396 MMOL/KG (ref 100–1200)
OXYHGB MFR BLDV: 82.9 % (ref 70–75)
P AXIS - MUSE: 74 DEGREES
PCO2 BLDV: 49 MM HG (ref 35–50)
PH BLDV: 7.37 [PH] (ref 7.35–7.45)
PH UR STRIP: 5 [PH] (ref 5–7)
PLATELET # BLD AUTO: 190 10E3/UL (ref 150–450)
PLATELET # BLD AUTO: 203 10E3/UL (ref 150–450)
PO2 BLDV: 52 MM HG (ref 25–47)
POTASSIUM BLD-SCNC: 3.1 MMOL/L (ref 3.5–5)
POTASSIUM BLD-SCNC: 3.4 MMOL/L (ref 3.5–5)
POTASSIUM BLD-SCNC: 3.6 MMOL/L (ref 3.5–5)
PR INTERVAL - MUSE: 142 MS
PROCALCITONIN SERPL-MCNC: 0.21 NG/ML (ref 0–0.49)
PROCALCITONIN SERPL-MCNC: 0.21 NG/ML (ref 0–0.49)
PROT SERPL-MCNC: 6.7 G/DL (ref 6–8)
PROT SERPL-MCNC: 6.9 G/DL (ref 6–8)
QRS DURATION - MUSE: 92 MS
QT - MUSE: 394 MS
QTC - MUSE: 471 MS
R AXIS - MUSE: 72 DEGREES
RBC # BLD AUTO: 4.11 10E6/UL (ref 4.4–5.9)
RBC # BLD AUTO: 4.34 10E6/UL (ref 4.4–5.9)
RBC URINE: 1 /HPF
S PNEUM AG SPEC QL: NEGATIVE
SAO2 % BLDV: 84.9 % (ref 70–75)
SARS-COV-2 RNA RESP QL NAA+PROBE: NEGATIVE
SODIUM SERPL-SCNC: 129 MMOL/L (ref 136–145)
SODIUM SERPL-SCNC: 129 MMOL/L (ref 136–145)
SODIUM SERPL-SCNC: 131 MMOL/L (ref 136–145)
SODIUM SERPL-SCNC: 131 MMOL/L (ref 136–145)
SODIUM UR-SCNC: <20 MMOL/L
SP GR UR STRIP: 1.03 (ref 1–1.03)
SYSTOLIC BLOOD PRESSURE - MUSE: NORMAL MMHG
T AXIS - MUSE: 73 DEGREES
TROPONIN I SERPL-MCNC: <0.01 NG/ML (ref 0–0.29)
TROPONIN I SERPL-MCNC: <0.01 NG/ML (ref 0–0.29)
UROBILINOGEN UR STRIP-MCNC: <2 MG/DL
VENTRICULAR RATE- MUSE: 86 BPM
WBC # BLD AUTO: 7.3 10E3/UL (ref 4–11)
WBC # BLD AUTO: 9.2 10E3/UL (ref 4–11)
WBC URINE: 1 /HPF

## 2023-07-13 PROCEDURE — 82805 BLOOD GASES W/O2 SATURATION: CPT | Performed by: INTERNAL MEDICINE

## 2023-07-13 PROCEDURE — 83930 ASSAY OF BLOOD OSMOLALITY: CPT | Performed by: INTERNAL MEDICINE

## 2023-07-13 PROCEDURE — 258N000003 HC RX IP 258 OP 636: Performed by: INTERNAL MEDICINE

## 2023-07-13 PROCEDURE — 84484 ASSAY OF TROPONIN QUANT: CPT | Performed by: INTERNAL MEDICINE

## 2023-07-13 PROCEDURE — 84145 PROCALCITONIN (PCT): CPT | Mod: 91 | Performed by: INTERNAL MEDICINE

## 2023-07-13 PROCEDURE — 83935 ASSAY OF URINE OSMOLALITY: CPT | Performed by: INTERNAL MEDICINE

## 2023-07-13 PROCEDURE — 83735 ASSAY OF MAGNESIUM: CPT | Performed by: INTERNAL MEDICINE

## 2023-07-13 PROCEDURE — 258N000003 HC RX IP 258 OP 636: Performed by: EMERGENCY MEDICINE

## 2023-07-13 PROCEDURE — 84295 ASSAY OF SERUM SODIUM: CPT | Performed by: INTERNAL MEDICINE

## 2023-07-13 PROCEDURE — 71045 X-RAY EXAM CHEST 1 VIEW: CPT

## 2023-07-13 PROCEDURE — 99285 EMERGENCY DEPT VISIT HI MDM: CPT | Mod: 25

## 2023-07-13 PROCEDURE — 96365 THER/PROPH/DIAG IV INF INIT: CPT

## 2023-07-13 PROCEDURE — 250N000011 HC RX IP 250 OP 636: Mod: JZ | Performed by: INTERNAL MEDICINE

## 2023-07-13 PROCEDURE — 87635 SARS-COV-2 COVID-19 AMP PRB: CPT | Performed by: EMERGENCY MEDICINE

## 2023-07-13 PROCEDURE — 96368 THER/DIAG CONCURRENT INF: CPT

## 2023-07-13 PROCEDURE — 82962 GLUCOSE BLOOD TEST: CPT

## 2023-07-13 PROCEDURE — 96366 THER/PROPH/DIAG IV INF ADDON: CPT

## 2023-07-13 PROCEDURE — 87899 AGENT NOS ASSAY W/OPTIC: CPT | Performed by: INTERNAL MEDICINE

## 2023-07-13 PROCEDURE — 85025 COMPLETE CBC W/AUTO DIFF WBC: CPT | Performed by: EMERGENCY MEDICINE

## 2023-07-13 PROCEDURE — 120N000001 HC R&B MED SURG/OB

## 2023-07-13 PROCEDURE — 83735 ASSAY OF MAGNESIUM: CPT | Performed by: EMERGENCY MEDICINE

## 2023-07-13 PROCEDURE — 84132 ASSAY OF SERUM POTASSIUM: CPT | Mod: 91 | Performed by: INTERNAL MEDICINE

## 2023-07-13 PROCEDURE — 83605 ASSAY OF LACTIC ACID: CPT | Mod: 91 | Performed by: EMERGENCY MEDICINE

## 2023-07-13 PROCEDURE — 96376 TX/PRO/DX INJ SAME DRUG ADON: CPT

## 2023-07-13 PROCEDURE — G0378 HOSPITAL OBSERVATION PER HR: HCPCS

## 2023-07-13 PROCEDURE — 250N000013 HC RX MED GY IP 250 OP 250 PS 637: Performed by: INTERNAL MEDICINE

## 2023-07-13 PROCEDURE — 87070 CULTURE OTHR SPECIMN AEROBIC: CPT | Mod: XS | Performed by: INTERNAL MEDICINE

## 2023-07-13 PROCEDURE — 36415 COLL VENOUS BLD VENIPUNCTURE: CPT | Performed by: INTERNAL MEDICINE

## 2023-07-13 PROCEDURE — 999N000157 HC STATISTIC RCP TIME EA 10 MIN

## 2023-07-13 PROCEDURE — 87040 BLOOD CULTURE FOR BACTERIA: CPT | Performed by: EMERGENCY MEDICINE

## 2023-07-13 PROCEDURE — 250N000009 HC RX 250: Performed by: INTERNAL MEDICINE

## 2023-07-13 PROCEDURE — 94640 AIRWAY INHALATION TREATMENT: CPT

## 2023-07-13 PROCEDURE — 250N000011 HC RX IP 250 OP 636: Performed by: INTERNAL MEDICINE

## 2023-07-13 PROCEDURE — 84450 TRANSFERASE (AST) (SGOT): CPT | Mod: 91 | Performed by: INTERNAL MEDICINE

## 2023-07-13 PROCEDURE — 250N000011 HC RX IP 250 OP 636: Performed by: EMERGENCY MEDICINE

## 2023-07-13 PROCEDURE — 96367 TX/PROPH/DG ADDL SEQ IV INF: CPT

## 2023-07-13 PROCEDURE — 87205 SMEAR GRAM STAIN: CPT | Performed by: INTERNAL MEDICINE

## 2023-07-13 PROCEDURE — 250N000013 HC RX MED GY IP 250 OP 250 PS 637: Performed by: EMERGENCY MEDICINE

## 2023-07-13 PROCEDURE — 96375 TX/PRO/DX INJ NEW DRUG ADDON: CPT

## 2023-07-13 PROCEDURE — 84300 ASSAY OF URINE SODIUM: CPT | Performed by: INTERNAL MEDICINE

## 2023-07-13 PROCEDURE — 93005 ELECTROCARDIOGRAM TRACING: CPT | Performed by: EMERGENCY MEDICINE

## 2023-07-13 PROCEDURE — 96372 THER/PROPH/DIAG INJ SC/IM: CPT | Mod: XU

## 2023-07-13 PROCEDURE — 250N000009 HC RX 250: Performed by: EMERGENCY MEDICINE

## 2023-07-13 PROCEDURE — 71275 CT ANGIOGRAPHY CHEST: CPT

## 2023-07-13 PROCEDURE — 36415 COLL VENOUS BLD VENIPUNCTURE: CPT | Performed by: EMERGENCY MEDICINE

## 2023-07-13 PROCEDURE — 94640 AIRWAY INHALATION TREATMENT: CPT | Mod: 76

## 2023-07-13 PROCEDURE — 84484 ASSAY OF TROPONIN QUANT: CPT | Performed by: EMERGENCY MEDICINE

## 2023-07-13 PROCEDURE — 83880 ASSAY OF NATRIURETIC PEPTIDE: CPT | Performed by: INTERNAL MEDICINE

## 2023-07-13 PROCEDURE — 99223 1ST HOSP IP/OBS HIGH 75: CPT | Mod: AI | Performed by: INTERNAL MEDICINE

## 2023-07-13 PROCEDURE — 96361 HYDRATE IV INFUSION ADD-ON: CPT

## 2023-07-13 PROCEDURE — 80053 COMPREHEN METABOLIC PANEL: CPT | Performed by: EMERGENCY MEDICINE

## 2023-07-13 PROCEDURE — 96374 THER/PROPH/DIAG INJ IV PUSH: CPT | Mod: 59

## 2023-07-13 PROCEDURE — 81001 URINALYSIS AUTO W/SCOPE: CPT | Performed by: INTERNAL MEDICINE

## 2023-07-13 PROCEDURE — 85025 COMPLETE CBC W/AUTO DIFF WBC: CPT | Performed by: INTERNAL MEDICINE

## 2023-07-13 PROCEDURE — 99238 HOSP IP/OBS DSCHRG MGMT 30/<: CPT | Performed by: INTERNAL MEDICINE

## 2023-07-13 PROCEDURE — 84145 PROCALCITONIN (PCT): CPT | Performed by: EMERGENCY MEDICINE

## 2023-07-13 PROCEDURE — 250N000012 HC RX MED GY IP 250 OP 636 PS 637: Performed by: INTERNAL MEDICINE

## 2023-07-13 RX ORDER — MAGNESIUM SULFATE HEPTAHYDRATE 40 MG/ML
2 INJECTION, SOLUTION INTRAVENOUS ONCE
Status: COMPLETED | OUTPATIENT
Start: 2023-07-13 | End: 2023-07-13

## 2023-07-13 RX ORDER — TAMSULOSIN HYDROCHLORIDE 0.4 MG/1
0.8 CAPSULE ORAL
Status: DISCONTINUED | OUTPATIENT
Start: 2023-07-13 | End: 2023-07-13 | Stop reason: HOSPADM

## 2023-07-13 RX ORDER — METHYLPREDNISOLONE SODIUM SUCCINATE 125 MG/2ML
60 INJECTION, POWDER, LYOPHILIZED, FOR SOLUTION INTRAMUSCULAR; INTRAVENOUS EVERY 8 HOURS
Status: DISCONTINUED | OUTPATIENT
Start: 2023-07-13 | End: 2023-07-13 | Stop reason: HOSPADM

## 2023-07-13 RX ORDER — HYDROCODONE BITARTRATE AND ACETAMINOPHEN 5; 325 MG/1; MG/1
1 TABLET ORAL EVERY 4 HOURS PRN
Status: DISCONTINUED | OUTPATIENT
Start: 2023-07-13 | End: 2023-07-13

## 2023-07-13 RX ORDER — DEXTROAMPHETAMINE SACCHARATE, AMPHETAMINE ASPARTATE, DEXTROAMPHETAMINE SULFATE AND AMPHETAMINE SULFATE 2.5; 2.5; 2.5; 2.5 MG/1; MG/1; MG/1; MG/1
30 TABLET ORAL 2 TIMES DAILY
Status: DISCONTINUED | OUTPATIENT
Start: 2023-07-13 | End: 2023-07-13 | Stop reason: HOSPADM

## 2023-07-13 RX ORDER — AZITHROMYCIN 500 MG/5ML
500 INJECTION, POWDER, LYOPHILIZED, FOR SOLUTION INTRAVENOUS EVERY 24 HOURS
Status: DISCONTINUED | OUTPATIENT
Start: 2023-07-14 | End: 2023-07-13 | Stop reason: HOSPADM

## 2023-07-13 RX ORDER — BENZONATATE 100 MG/1
100 CAPSULE ORAL 3 TIMES DAILY PRN
Status: DISCONTINUED | OUTPATIENT
Start: 2023-07-13 | End: 2023-07-13 | Stop reason: HOSPADM

## 2023-07-13 RX ORDER — METHYLPREDNISOLONE SODIUM SUCCINATE 125 MG/2ML
125 INJECTION, POWDER, LYOPHILIZED, FOR SOLUTION INTRAMUSCULAR; INTRAVENOUS ONCE
Status: COMPLETED | OUTPATIENT
Start: 2023-07-13 | End: 2023-07-13

## 2023-07-13 RX ORDER — ACETAMINOPHEN 650 MG/1
650 SUPPOSITORY RECTAL EVERY 6 HOURS PRN
Status: DISCONTINUED | OUTPATIENT
Start: 2023-07-13 | End: 2023-07-13 | Stop reason: HOSPADM

## 2023-07-13 RX ORDER — CEFTRIAXONE 2 G/1
2 INJECTION, POWDER, FOR SOLUTION INTRAMUSCULAR; INTRAVENOUS ONCE
Status: COMPLETED | OUTPATIENT
Start: 2023-07-13 | End: 2023-07-13

## 2023-07-13 RX ORDER — GABAPENTIN 400 MG/1
1200 CAPSULE ORAL 3 TIMES DAILY
Status: DISCONTINUED | OUTPATIENT
Start: 2023-07-13 | End: 2023-07-13 | Stop reason: HOSPADM

## 2023-07-13 RX ORDER — IPRATROPIUM BROMIDE AND ALBUTEROL SULFATE 2.5; .5 MG/3ML; MG/3ML
3 SOLUTION RESPIRATORY (INHALATION)
Status: DISCONTINUED | OUTPATIENT
Start: 2023-07-13 | End: 2023-07-13 | Stop reason: HOSPADM

## 2023-07-13 RX ORDER — NALOXONE HYDROCHLORIDE 0.4 MG/ML
0.2 INJECTION, SOLUTION INTRAMUSCULAR; INTRAVENOUS; SUBCUTANEOUS
Status: DISCONTINUED | OUTPATIENT
Start: 2023-07-13 | End: 2023-07-13 | Stop reason: HOSPADM

## 2023-07-13 RX ORDER — ATORVASTATIN CALCIUM 10 MG/1
20 TABLET, FILM COATED ORAL EVERY MORNING
Status: DISCONTINUED | OUTPATIENT
Start: 2023-07-13 | End: 2023-07-13 | Stop reason: HOSPADM

## 2023-07-13 RX ORDER — IOPAMIDOL 755 MG/ML
90 INJECTION, SOLUTION INTRAVASCULAR ONCE
Status: COMPLETED | OUTPATIENT
Start: 2023-07-13 | End: 2023-07-13

## 2023-07-13 RX ORDER — KETOROLAC TROMETHAMINE 15 MG/ML
15 INJECTION, SOLUTION INTRAMUSCULAR; INTRAVENOUS ONCE
Status: COMPLETED | OUTPATIENT
Start: 2023-07-13 | End: 2023-07-13

## 2023-07-13 RX ORDER — POTASSIUM CHLORIDE 7.45 MG/ML
10 INJECTION INTRAVENOUS ONCE
Status: COMPLETED | OUTPATIENT
Start: 2023-07-13 | End: 2023-07-13

## 2023-07-13 RX ORDER — PREDNISONE 20 MG/1
40 TABLET ORAL DAILY
Qty: 10 TABLET | Refills: 0 | Status: ON HOLD | OUTPATIENT
Start: 2023-07-13 | End: 2024-04-23

## 2023-07-13 RX ORDER — CEFDINIR 300 MG/1
300 CAPSULE ORAL 2 TIMES DAILY
Qty: 14 CAPSULE | Refills: 0 | Status: ON HOLD | OUTPATIENT
Start: 2023-07-13 | End: 2024-04-23

## 2023-07-13 RX ORDER — SODIUM CHLORIDE 9 MG/ML
INJECTION, SOLUTION INTRAVENOUS CONTINUOUS
Status: DISCONTINUED | OUTPATIENT
Start: 2023-07-13 | End: 2023-07-13 | Stop reason: HOSPADM

## 2023-07-13 RX ORDER — ALBUTEROL SULFATE 0.83 MG/ML
2.5 SOLUTION RESPIRATORY (INHALATION)
Status: DISCONTINUED | OUTPATIENT
Start: 2023-07-13 | End: 2023-07-13 | Stop reason: HOSPADM

## 2023-07-13 RX ORDER — NALOXONE HYDROCHLORIDE 0.4 MG/ML
0.4 INJECTION, SOLUTION INTRAMUSCULAR; INTRAVENOUS; SUBCUTANEOUS
Status: DISCONTINUED | OUTPATIENT
Start: 2023-07-13 | End: 2023-07-13 | Stop reason: HOSPADM

## 2023-07-13 RX ORDER — NICOTINE POLACRILEX 4 MG
15-30 LOZENGE BUCCAL
Status: DISCONTINUED | OUTPATIENT
Start: 2023-07-13 | End: 2023-07-13 | Stop reason: HOSPADM

## 2023-07-13 RX ORDER — IPRATROPIUM BROMIDE AND ALBUTEROL SULFATE 2.5; .5 MG/3ML; MG/3ML
1 SOLUTION RESPIRATORY (INHALATION) EVERY 6 HOURS PRN
Qty: 90 ML | Refills: 0 | Status: SHIPPED | OUTPATIENT
Start: 2023-07-13

## 2023-07-13 RX ORDER — IPRATROPIUM BROMIDE AND ALBUTEROL SULFATE 2.5; .5 MG/3ML; MG/3ML
3 SOLUTION RESPIRATORY (INHALATION)
Status: DISCONTINUED | OUTPATIENT
Start: 2023-07-13 | End: 2023-07-13

## 2023-07-13 RX ORDER — HYDROMORPHONE HCL IN WATER/PF 6 MG/30 ML
0.2 PATIENT CONTROLLED ANALGESIA SYRINGE INTRAVENOUS EVERY 4 HOURS PRN
Status: DISCONTINUED | OUTPATIENT
Start: 2023-07-13 | End: 2023-07-13

## 2023-07-13 RX ORDER — VENLAFAXINE HYDROCHLORIDE 150 MG/1
150 CAPSULE, EXTENDED RELEASE ORAL AT BEDTIME
Status: DISCONTINUED | OUTPATIENT
Start: 2023-07-13 | End: 2023-07-13 | Stop reason: HOSPADM

## 2023-07-13 RX ORDER — IPRATROPIUM BROMIDE AND ALBUTEROL SULFATE 2.5; .5 MG/3ML; MG/3ML
3 SOLUTION RESPIRATORY (INHALATION) ONCE
Status: COMPLETED | OUTPATIENT
Start: 2023-07-13 | End: 2023-07-13

## 2023-07-13 RX ORDER — POTASSIUM CHLORIDE 1500 MG/1
40 TABLET, EXTENDED RELEASE ORAL ONCE
Status: COMPLETED | OUTPATIENT
Start: 2023-07-13 | End: 2023-07-13

## 2023-07-13 RX ORDER — LAMOTRIGINE 100 MG/1
100 TABLET ORAL 2 TIMES DAILY
Status: DISCONTINUED | OUTPATIENT
Start: 2023-07-13 | End: 2023-07-13 | Stop reason: HOSPADM

## 2023-07-13 RX ORDER — GUAIFENESIN 200 MG/10ML
200 LIQUID ORAL EVERY 4 HOURS PRN
Status: DISCONTINUED | OUTPATIENT
Start: 2023-07-13 | End: 2023-07-13 | Stop reason: HOSPADM

## 2023-07-13 RX ORDER — ENOXAPARIN SODIUM 100 MG/ML
40 INJECTION SUBCUTANEOUS EVERY 24 HOURS
Status: DISCONTINUED | OUTPATIENT
Start: 2023-07-13 | End: 2023-07-13 | Stop reason: HOSPADM

## 2023-07-13 RX ORDER — CEFTRIAXONE 2 G/1
2 INJECTION, POWDER, FOR SOLUTION INTRAMUSCULAR; INTRAVENOUS EVERY 24 HOURS
Status: DISCONTINUED | OUTPATIENT
Start: 2023-07-14 | End: 2023-07-13 | Stop reason: HOSPADM

## 2023-07-13 RX ORDER — ACETAMINOPHEN 325 MG/1
650 TABLET ORAL ONCE
Status: COMPLETED | OUTPATIENT
Start: 2023-07-13 | End: 2023-07-13

## 2023-07-13 RX ORDER — OXYCODONE AND ACETAMINOPHEN 10; 325 MG/1; MG/1
1-2 TABLET ORAL EVERY 8 HOURS PRN
Status: DISCONTINUED | OUTPATIENT
Start: 2023-07-13 | End: 2023-07-13 | Stop reason: HOSPADM

## 2023-07-13 RX ORDER — DEXTROSE MONOHYDRATE 25 G/50ML
25-50 INJECTION, SOLUTION INTRAVENOUS
Status: DISCONTINUED | OUTPATIENT
Start: 2023-07-13 | End: 2023-07-13 | Stop reason: HOSPADM

## 2023-07-13 RX ORDER — ACETAMINOPHEN 325 MG/1
650 TABLET ORAL EVERY 4 HOURS PRN
Status: DISCONTINUED | OUTPATIENT
Start: 2023-07-13 | End: 2023-07-13 | Stop reason: HOSPADM

## 2023-07-13 RX ORDER — DEXTROAMPHETAMINE SACCHARATE, AMPHETAMINE ASPARTATE, DEXTROAMPHETAMINE SULFATE AND AMPHETAMINE SULFATE 7.5; 7.5; 7.5; 7.5 MG/1; MG/1; MG/1; MG/1
30 TABLET ORAL
COMMUNITY

## 2023-07-13 RX ORDER — TESTOSTERONE GEL, 1% 10 MG/G
2 GEL TRANSDERMAL DAILY
COMMUNITY

## 2023-07-13 RX ORDER — IPRATROPIUM BROMIDE AND ALBUTEROL SULFATE 2.5; .5 MG/3ML; MG/3ML
3 SOLUTION RESPIRATORY (INHALATION) 3 TIMES DAILY
Status: DISCONTINUED | OUTPATIENT
Start: 2023-07-13 | End: 2023-07-13

## 2023-07-13 RX ORDER — POTASSIUM CHLORIDE 1500 MG/1
20 TABLET, EXTENDED RELEASE ORAL ONCE
Status: COMPLETED | OUTPATIENT
Start: 2023-07-13 | End: 2023-07-13

## 2023-07-13 RX ORDER — LIDOCAINE 40 MG/G
CREAM TOPICAL
Status: DISCONTINUED | OUTPATIENT
Start: 2023-07-13 | End: 2023-07-13 | Stop reason: HOSPADM

## 2023-07-13 RX ORDER — AZITHROMYCIN 500 MG/5ML
500 INJECTION, POWDER, LYOPHILIZED, FOR SOLUTION INTRAVENOUS ONCE
Status: COMPLETED | OUTPATIENT
Start: 2023-07-13 | End: 2023-07-13

## 2023-07-13 RX ORDER — BUSPIRONE HYDROCHLORIDE 10 MG/1
10 TABLET ORAL 3 TIMES DAILY
Status: DISCONTINUED | OUTPATIENT
Start: 2023-07-13 | End: 2023-07-13 | Stop reason: HOSPADM

## 2023-07-13 RX ADMIN — POTASSIUM CHLORIDE 20 MEQ: 1500 TABLET, EXTENDED RELEASE ORAL at 11:01

## 2023-07-13 RX ADMIN — IPRATROPIUM BROMIDE AND ALBUTEROL SULFATE 3 ML: .5; 3 SOLUTION RESPIRATORY (INHALATION) at 01:33

## 2023-07-13 RX ADMIN — MAGNESIUM SULFATE HEPTAHYDRATE 2 G: 40 INJECTION, SOLUTION INTRAVENOUS at 03:49

## 2023-07-13 RX ADMIN — METHYLPREDNISOLONE SODIUM SUCCINATE 125 MG: 125 INJECTION, POWDER, FOR SOLUTION INTRAMUSCULAR; INTRAVENOUS at 01:59

## 2023-07-13 RX ADMIN — MAGNESIUM SULFATE HEPTAHYDRATE 2 G: 40 INJECTION, SOLUTION INTRAVENOUS at 05:40

## 2023-07-13 RX ADMIN — METHYLPREDNISOLONE SODIUM SUCCINATE 62.5 MG: 125 INJECTION, POWDER, FOR SOLUTION INTRAMUSCULAR; INTRAVENOUS at 09:26

## 2023-07-13 RX ADMIN — LAMOTRIGINE 100 MG: 100 TABLET ORAL at 13:17

## 2023-07-13 RX ADMIN — KETOROLAC TROMETHAMINE 15 MG: 15 INJECTION, SOLUTION INTRAMUSCULAR; INTRAVENOUS at 01:59

## 2023-07-13 RX ADMIN — IPRATROPIUM BROMIDE AND ALBUTEROL SULFATE 3 ML: .5; 3 SOLUTION RESPIRATORY (INHALATION) at 14:01

## 2023-07-13 RX ADMIN — SODIUM CHLORIDE: 9 INJECTION, SOLUTION INTRAVENOUS at 13:16

## 2023-07-13 RX ADMIN — TAMSULOSIN HYDROCHLORIDE 0.8 MG: 0.4 CAPSULE ORAL at 11:01

## 2023-07-13 RX ADMIN — ENOXAPARIN SODIUM 40 MG: 100 INJECTION SUBCUTANEOUS at 13:17

## 2023-07-13 RX ADMIN — IPRATROPIUM BROMIDE AND ALBUTEROL SULFATE 3 ML: .5; 3 SOLUTION RESPIRATORY (INHALATION) at 08:00

## 2023-07-13 RX ADMIN — IOPAMIDOL 90 ML: 755 INJECTION, SOLUTION INTRAVENOUS at 04:29

## 2023-07-13 RX ADMIN — POTASSIUM CHLORIDE 10 MEQ: 7.46 INJECTION, SOLUTION INTRAVENOUS at 03:50

## 2023-07-13 RX ADMIN — INSULIN ASPART 3 UNITS: 100 INJECTION, SOLUTION INTRAVENOUS; SUBCUTANEOUS at 13:18

## 2023-07-13 RX ADMIN — AZITHROMYCIN MONOHYDRATE 500 MG: 500 INJECTION, POWDER, LYOPHILIZED, FOR SOLUTION INTRAVENOUS at 04:57

## 2023-07-13 RX ADMIN — BENZONATATE 100 MG: 100 CAPSULE ORAL at 11:01

## 2023-07-13 RX ADMIN — SODIUM CHLORIDE 500 ML: 9 INJECTION, SOLUTION INTRAVENOUS at 01:57

## 2023-07-13 RX ADMIN — POTASSIUM CHLORIDE 40 MEQ: 1500 TABLET, EXTENDED RELEASE ORAL at 03:38

## 2023-07-13 RX ADMIN — DEXTROAMPHETAMINE SACCHARATE, AMPHETAMINE ASPARTATE, DEXTROAMPHETAMINE SULFATE AND AMPHETAMINE SULFATE 30 MG: 2.5; 2.5; 2.5; 2.5 TABLET ORAL at 11:01

## 2023-07-13 RX ADMIN — HYDROCODONE BITARTRATE AND ACETAMINOPHEN 1 TABLET: 5; 325 TABLET ORAL at 09:27

## 2023-07-13 RX ADMIN — CEFTRIAXONE SODIUM 2 G: 2 INJECTION, POWDER, FOR SOLUTION INTRAMUSCULAR; INTRAVENOUS at 03:40

## 2023-07-13 RX ADMIN — BUSPIRONE HYDROCHLORIDE 10 MG: 10 TABLET ORAL at 13:17

## 2023-07-13 RX ADMIN — ACETAMINOPHEN 650 MG: 325 TABLET ORAL at 01:59

## 2023-07-13 RX ADMIN — SODIUM CHLORIDE 500 ML: 9 INJECTION, SOLUTION INTRAVENOUS at 05:10

## 2023-07-13 RX ADMIN — GABAPENTIN 1200 MG: 400 CAPSULE ORAL at 11:01

## 2023-07-13 RX ADMIN — ATORVASTATIN CALCIUM 20 MG: 10 TABLET, FILM COATED ORAL at 11:01

## 2023-07-13 ASSESSMENT — ENCOUNTER SYMPTOMS
DIARRHEA: 0
WHEEZING: 1
WEAKNESS: 0
SHORTNESS OF BREATH: 1
LIGHT-HEADEDNESS: 0
COUGH: 1
NAUSEA: 0
PALPITATIONS: 0
FEVER: 1
ABDOMINAL PAIN: 0
VOMITING: 0
CHILLS: 1
DIZZINESS: 0
CHEST TIGHTNESS: 1
MYALGIAS: 1

## 2023-07-13 ASSESSMENT — ACTIVITIES OF DAILY LIVING (ADL)
ADLS_ACUITY_SCORE: 35
ADLS_ACUITY_SCORE: 19
ADLS_ACUITY_SCORE: 19
ADLS_ACUITY_SCORE: 35
ADLS_ACUITY_SCORE: 19
ADLS_ACUITY_SCORE: 18
ADLS_ACUITY_SCORE: 19
ADLS_ACUITY_SCORE: 33

## 2023-07-13 NOTE — ED TRIAGE NOTES
On day 5 of sickness, pt reports has not tested for COVID, reports headache, short of breath, sore from cough, fever.  Symptoms getting worse.  Oxygen sats on room air 89-91%.  Pt has asthma, using rescue inhaler with little help.  Pt has history of COPD as well.  Pt took inhaler and oxycodone with tylenol all around 2200.  Pt has not had ibuprofen today.     Triage Assessment       Row Name 07/13/23 0039       Triage Assessment (Adult)    Airway WDL WDL       Respiratory WDL    Respiratory WDL X;rhythm/pattern;cough    Rhythm/Pattern, Respiratory shortness of breath    Cough Frequency frequent    Cough Type congested;productive       Skin Circulation/Temperature WDL    Skin Circulation/Temperature WDL WDL       Cardiac WDL    Cardiac WDL WDL       Peripheral/Neurovascular WDL    Peripheral Neurovascular WDL WDL       Cognitive/Neuro/Behavioral WDL    Cognitive/Neuro/Behavioral WDL WDL

## 2023-07-13 NOTE — H&P
[facility] MEDICINE ADMISSION HISTORY AND PHYSICAL       Assessment & Plan      1. SOB and cough -- Chest XR - Right hilar consolidation or mass.  (+) Wheezing, acute bronchospasms vs undiagnosed COPD - smokes at least half a pack per day since 16 years old. Dont see clinic notes about COPD.     CT chest imaging 6 months ago - no mass     Mild lactic acid elevation - sepsis VS metformin use VS B2 agonist use. Not toxic appearing.     - IVF, Antibiotics, nebs PRN, cough supp  - blood culture, sputum culture, PCR, antigens  - smoking cessation. Consider PFTs at some point to assess severity of COPD - need to include in discharge summary   - tele and pulse ox     2. DM2 -   - sliding scale insulin/FS  - HOLD metformin - IV dye exposure     3. Adjustment disorder, Anxiety, ADHD  - PTA meds     4. HTN  - PTA meds     5. Hyponatremia of 129, severe Hypomag of 0.8 - could be from poor oral intake. Denies GI loss or  ETOH use  - electrolyte replacements   - osms check, urine Na       VTE prophylaxis --  SCDs,   Diet --  DM  Code Status -- Full   Barriers to discharge -- admitting clinical condition  Discharge Disposition and goals --  Unable to determine at this point, pending clinical progress and response to treatment. Patient may need transfer to SNF or ACR if unsafe to go home and needed treatment inappropriate at home setting OR may need home health care evaluation if care can be delivered at home settings. Consider referral to care manager/    PPE - I was wearing PPE when I met the patient including but not limited to - N95 mask, Gloves, and/or Safety glasses.  Admission Status -- Inpatient      Care plan was created based on available information and patient's condition at the time of encounter. This was discussed with the patient and/or family members using layman's terms, including counseling/education and they have agreed to proceed. I recommend to revise care plan and to  review history if there is change in condition and/or new clinical information that is not available during my encounter. At the end of night shift, this case will be presented to the AM rounding Hospitalist.       Km Dang MD, MPH, FACP, [facility]  Internal Medicine - Hospitalist        Chief Complaint SOB      HISTORY     - I met the patient in ED - 14. He is here with 4-5 days of SOB, coughing yellowish phlegm, poor intake, and fevers. No travels. Smokes half-1 pack per day since 16 years old - and reported he has COPD. No CP.  No diarrhea. No urinary symptoms. No drugs, No ETOH.     - ED course - Mag 0.8. Chest XR -- Right hilar consolidation or mass. Mild lactic acid elev. Na of 129. He was given steroid, nebs antibiotics and planned for PE study. On O2, given 88-89% on RA.     - ROS --- No headache. No dizziness. No weakness. No palpitations. No abdominal pain. No nausea or vomiting. No urinary symptoms. No bleeding symptoms. No weight loss. Rest of 12 point ROS was reviewed and negative.       Past Medical History     Past Medical History:   Diagnosis Date     COPD (chronic obstructive pulmonary disease) (H)      Diabetes (H)          Surgical History     No past surgical history on file.     Family History      No family history on file.      Social History      .  Social History     Socioeconomic History     Marital status:      Spouse name: Not on file     Number of children: Not on file     Years of education: Not on file     Highest education level: Not on file   Occupational History     Not on file   Tobacco Use     Smoking status: Every Day     Packs/day: 1.00     Types: Cigarettes     Smokeless tobacco: Not on file   Substance and Sexual Activity     Alcohol use: Not Currently     Drug use: Not on file     Sexual activity: Not on file   Other Topics Concern     Not on file   Social History Narrative     Not on file     Social Determinants of Health     Financial Resource Strain: Not on file  "  Food Insecurity: Not on file   Transportation Needs: Not on file   Physical Activity: Not on file   Stress: Not on file   Social Connections: Not on file   Intimate Partner Violence: Not on file   Housing Stability: Not on file          Allergies      No Known Allergies      Prior to Admission Medications      No current facility-administered medications on file prior to encounter.  ADDERALL XR 30 MG 24 hr capsule, Take 30 mg by mouth 2 times daily Morning and Afternoon  ADVAIR DISKUS 250-50 MCG/ACT inhaler, Inhale 1 puff into the lungs 2 times daily  atorvastatin (LIPITOR) 20 MG tablet, Take 20 mg by mouth every morning  busPIRone (BUSPAR) 10 MG tablet, Take 10 mg by mouth 3 times daily  fluticasone (FLONASE) 50 MCG/ACT nasal spray, Spray 2 sprays into both nostrils At Bedtime  gabapentin (NEURONTIN) 300 MG capsule, Take 600 mg by mouth 4 times daily  lamoTRIgine (LAMICTAL) 100 MG tablet, Take 100 mg by mouth 2 times daily  LANTUS SOLOSTAR 100 UNIT/ML soln, Inject 5 Units Subcutaneous At Bedtime  lisinopril-hydrochlorothiazide (ZESTORETIC) 10-12.5 MG tablet, Take 1 tablet by mouth daily  metFORMIN (GLUCOPHAGE) 1000 MG tablet, Take 1,000 mg by mouth 2 times daily (with meals)  oxyCODONE-acetaminophen (PERCOCET)  MG per tablet, Take 1-2 tablets by mouth every 8 hours as needed  tamsulosin (FLOMAX) 0.4 MG capsule, Take 0.8 mg by mouth daily (with lunch)  venlafaxine (EFFEXOR XR) 150 MG 24 hr capsule, Take 150 mg by mouth At Bedtime  VENTOLIN  (90 Base) MCG/ACT inhaler, Inhale 1-2 puffs into the lungs every 4 hours as needed            Review of Systems     A 12 point comprehensive review of systems was negative except as noted above in HPI.    PHYSICAL EXAMINATION       Vitals      Vitals: /63   Pulse 85   Temp 98.7  F (37.1  C) (Oral)   Resp 21   Ht 1.778 m (5' 10\")   Wt 119.3 kg (263 lb)   SpO2 91%   BMI 37.74 kg/m    BMI= Body mass index is 37.74 kg/m .      Examination     General " Appearance:  Alert, cooperative, no distress  Head:    Normocephalic, without obvious abnormality, atraumatic  EENT:  PERRL, conjunctiva/corneas clear, EOM's intact.   Neck:   Supple, symmetrical, trachea midline, no adenopathy; no NVE  Back:  Symmetric, no curvature, no CVA tenderness  Chest/Lungs: decreased air entry, (+) wheezing , respirations unlabored, No tenderness or deformity. No abdominal breathing or use of accessory muscles.   Heart:    Regular rate and rhythm, S1 and S2 normal, no murmur, rub   or gallop  Abdomen: Soft, non-tender, bowel sounds active all four quadrants, not peritoneal on palpation. Not distended  Extremities:  Extremities normal, atraumatic, no swelling   Skin:  Skin color, texture, turgor normal, no rashes or lesion  Neurologic:  Awake and alert, No lateralizing or localizing signs        Pertinent Lab     Results for orders placed or performed during the hospital encounter of 07/13/23   XR Chest Port 1 View    Impression    IMPRESSION: Right hilar consolidation or mass. Follow-up recommended.   Symptomatic COVID-19 Virus (Coronavirus) by PCR Nasopharyngeal    Specimen: Nasopharyngeal; Swab   Result Value Ref Range    SARS CoV2 PCR Negative Negative   Comprehensive metabolic panel   Result Value Ref Range    Sodium 129 (L) 136 - 145 mmol/L    Potassium 3.1 (L) 3.5 - 5.0 mmol/L    Chloride 84 (L) 98 - 107 mmol/L    Carbon Dioxide (CO2) 30 22 - 31 mmol/L    Anion Gap 15 5 - 18 mmol/L    Urea Nitrogen 13 8 - 22 mg/dL    Creatinine 0.79 0.70 - 1.30 mg/dL    Calcium 8.0 (L) 8.5 - 10.5 mg/dL    Glucose 126 (H) 70 - 125 mg/dL    Alkaline Phosphatase 56 45 - 120 U/L    AST 25 0 - 40 U/L    ALT 16 0 - 45 U/L    Protein Total 6.9 6.0 - 8.0 g/dL    Albumin 3.3 (L) 3.5 - 5.0 g/dL    Bilirubin Total 0.7 0.0 - 1.0 mg/dL    GFR Estimate >90 >60 mL/min/1.73m2   Result Value Ref Range    Troponin I <0.01 0.00 - 0.29 ng/mL   Result Value Ref Range    Procalcitonin 0.21 0.00 - 0.49 ng/mL   Result Value  Ref Range    Magnesium 0.8 (LL) 1.8 - 2.6 mg/dL   CBC with platelets and differential   Result Value Ref Range    WBC Count 9.2 4.0 - 11.0 10e3/uL    RBC Count 4.34 (L) 4.40 - 5.90 10e6/uL    Hemoglobin 12.7 (L) 13.3 - 17.7 g/dL    Hematocrit 37.0 (L) 40.0 - 53.0 %    MCV 85 78 - 100 fL    MCH 29.3 26.5 - 33.0 pg    MCHC 34.3 31.5 - 36.5 g/dL    RDW 13.9 10.0 - 15.0 %    Platelet Count 203 150 - 450 10e3/uL    % Neutrophils 71 %    % Lymphocytes 13 %    % Monocytes 16 %    % Eosinophils 0 %    % Basophils 0 %    % Immature Granulocytes 0 %    NRBCs per 100 WBC 0 <1 /100    Absolute Neutrophils 6.4 1.6 - 8.3 10e3/uL    Absolute Lymphocytes 1.2 0.8 - 5.3 10e3/uL    Absolute Monocytes 1.5 (H) 0.0 - 1.3 10e3/uL    Absolute Eosinophils 0.0 0.0 - 0.7 10e3/uL    Absolute Basophils 0.0 0.0 - 0.2 10e3/uL    Absolute Immature Granulocytes 0.0 <=0.4 10e3/uL    Absolute NRBCs 0.0 10e3/uL   ECG 12-LEAD WITH MUSE (LHE)   Result Value Ref Range    Systolic Blood Pressure  mmHg    Diastolic Blood Pressure  mmHg    Ventricular Rate 86 BPM    Atrial Rate 86 BPM    MN Interval 142 ms    QRS Duration 92 ms     ms    QTc 471 ms    P Axis 74 degrees    R AXIS 72 degrees    T Axis 73 degrees    Interpretation ECG       Sinus rhythm  Normal ECG  When compared with ECG of 24-DEC-2022 00:04,  Nonspecific T wave abnormality, improved in Anterior leads  Confirmed by SEE ED PROVIDER NOTE FOR, ECG INTERPRETATION ([id number]),  Veena Smith ([id number]) on 7/13/2023 2:34:30 AM             Pertinent Radiology

## 2023-07-13 NOTE — PROGRESS NOTES
Care Management Initial Consult    General Information  Assessment completed with: -chart review,    Type of CM/SW Visit: Chart Assessment    Primary Care Provider verified and updated as needed: Yes   Readmission within the last 30 days: no previous admission in last 30 days     Communication Assessment  Patient's communication style: spoken language (English or Bilingual)    Hearing Difficulty or Deaf: no   Wear Glasses or Blind: no        Additional Information:  SW reviewed chart.  Pt resides at home with spouse.  Pt is independent at baseline with I/ADLs.  Anticipate Pt will return home to [facility] living environment upon discharge.      CM following care progression to assist as needed with discharge planning and follow up on team recommendations.     KETURAH Moreno on 07/13/23 at 8:46 AM

## 2023-07-13 NOTE — PROGRESS NOTES
"[facility] Medicine PROGRESS NOTE      Identification/Summary: Rogelio Belle is a 53 year old male with a past medical history of COPD, not on home oxygen, hypertension, anxiety, adjustment disorder, low back pain who was admitted on 7/13/2023 for shortness of breath, admitted for COPD exacerbation.  CT PE run negative for pulmonary embolism, showed bronchial wall thickening, lymphadenopathy.   Is currently on 2 L oxygen.    Assessment and Plan:  COPD exacerbation  Ongoing tobacco abuse  Acute respiratory failure with hypoxemia  Acute bronchitis  Not on home oxygen at baseline  Fever of 102.9, no leukocytosis  Continue nebs, ceftriaxone azithromycin, IV Solu-Medrol  COVID, influenza RSV negative  Respiratory panel PCR pending     DM2 -  Monitor for steroid-induced hyperglycemia  Continue home Lantus at bedtime  - sliding scale insulin/FS  - HOLD metformin - IV dye exposure      Adjustment disorder, Anxiety, ADHD  On lamictal, buspirone,effexor      HTN  Hold lisinopril, hydrochlorothiazide because of hyponatremia     Hyponatremia of 129,   Severe hypomagnesemia  Replace per protocol  Sodium improving with fluids  Hold hydrochlorothiazide  Monitor sodium    Low back pain  Spinal stenosis  On gabapentin, Percocet as needed    Diet: Moderate Consistent Carb (60 g CHO per Meal) Diet  DVT Prophylaxis: Start Lovenox  Code Status: Full Code    Anticipated possible discharge in 2 days once medically improvement milestones are met.    Interval History/Subjective:  Shortness of breath is improving, cough with yellow to green-colored phlegm, no chest pain.  No other urinary or bowel complaints.    Physical Exam/Objective:  Vitals I/O   Vital signs:  Temp: 98.5  F (36.9  C) Temp src: Oral BP: 121/57 Pulse: 70   Resp: 18 SpO2: 95 % O2 Device: Nasal cannula Oxygen Delivery: 2 LPM Height: 177.8 cm (5' 10\") Weight: 118.9 kg (262 lb 3.2 oz)  Estimated body mass index is 37.62 kg/m  as calculated from the following:    " "Height as of this encounter: 1.778 m (5' 10\").    Weight as of this encounter: 118.9 kg (262 lb 3.2 oz). No intake/output data recorded.     Body mass index is 37.62 kg/m .    General Appearance:  Alert, cooperative, no distress   Head:  Normocephalic, atraumatic   Eyes:  PERRL    Throat:  mucosa; moist   Neck: No JVD, thyromegaly   Lungs:   Mild wheezing bilaterally, respirations unlabored   Chest Wall:  No tenderness or deformity   Heart:  Regular rate and rhythm, S1, S2 normal,no murmur   Abdomen:   Soft, non tender, non distended, bowel sounds present, no guarding or rigidity   Extremities: No edema, no joint swelling   Skin: Skin color, texture, turgor normal, no rashes or lesions   Neurologic: Alert and oriented X 3, Moves all 4 extremities       Medications:   Personally Reviewed.    sodium chloride 0.9%  500 mL Intravenous Once     amphetamine-dextroamphetamine  30 mg Oral BID     atorvastatin  20 mg Oral QAM     [START ON 7/14/2023] azithromycin  500 mg Intravenous Q24H     busPIRone  10 mg Oral TID     [START ON 7/14/2023] cefTRIAXone  2 g Intravenous Q24H     gabapentin  1,200 mg Oral TID     insulin aspart  1-7 Units Subcutaneous TID AC     insulin aspart  1-5 Units Subcutaneous At Bedtime     insulin glargine  5 Units Subcutaneous At Bedtime     ipratropium - albuterol 0.5 mg/2.5 mg/3 mL  3 mL Nebulization Q6H     lamoTRIgine  100 mg Oral BID     methylPREDNISolone sodium succinate  62.5 mg Intravenous Q8H     sodium chloride (PF)  3 mL Intracatheter Q8H     tamsulosin  0.8 mg Oral Daily with lunch     venlafaxine  150 mg Oral At Bedtime       Data reviewed today: I personally reviewed all new medications, labs, imaging/diagnostics reports over the past 24 hours. Pertinent findings include    Labs:  Most Recent 3 CBC's:Recent Labs   Lab Test 07/13/23  0627 07/13/23  0156 12/24/22  0359   WBC 7.3 9.2 12.6*   HGB 11.9* 12.7* 13.0*   MCV 85 85 90    203 284     Most Recent 3 BMP's:Recent Labs   Lab " Test 07/13/23  0906 07/13/23  0837 07/13/23  0644 07/13/23  0627 07/13/23  0156 12/24/22  0359   *  --   --  129* 129* 138   POTASSIUM 3.6  --   --  3.4* 3.1* 3.9   CHLORIDE  --   --   --  89* 84* 91*   CO2  --   --   --  27 30 36*   BUN  --   --   --  13 13 8   CR  --   --   --  0.77 0.79 0.75   ANIONGAP  --   --   --  13 15 11   AKBAR  --   --   --  7.8* 8.0* 9.0   GLC  --  188* 169* 171* 126* 100     Most Recent 2 LFT's:Recent Labs   Lab Test 07/13/23  0627 07/13/23 0156   AST 25 25   ALT 18 16   ALKPHOS 58 56   BILITOTAL 0.5 0.7       Imaging:   Recent Results (from the past 24 hour(s))   XR Chest Port 1 View    Narrative    EXAM: XR CHEST PORT 1 VIEW  LOCATION: [facility]  DATE: 7/13/2023    INDICATION: dyspnea, cough, copd.  COMPARISON: 12/24/2022.    FINDINGS: The heart size is normal. There is enlargement of the right hilum which is a change from the previous exam. The lungs are otherwise clear. There is no pneumothorax.      Impression    IMPRESSION: Right hilar consolidation or mass. Follow-up recommended.   CT Chest Pulmonary Embolism w Contrast    Narrative    EXAM: CT CHEST PULMONARY EMBOLISM W CONTRAST  LOCATION: [facility]  DATE: 7/13/2023    INDICATION: Suspect consolidation infection however eval mass and hypoxemia  COMPARISON: 12/24/2022  TECHNIQUE: CT chest pulmonary angiogram during arterial phase injection of IV contrast. Multiplanar reformats and MIP reconstructions were performed. Dose reduction techniques were used.   CONTRAST: [id number] 90ml    FINDINGS:  ANGIOGRAM CHEST: Pulmonary arteries are normal caliber and negative for pulmonary emboli. Thoracic aorta is negative for dissection. No CT evidence of right heart strain.    LUNGS AND PLEURA: Dependent atelectasis in the bilateral lung bases. Mild bronchial wall thickening in the posterior lower lungs. No pleural effusions. No focal lung infiltrates.    MEDIASTINUM/AXILLAE: Borderline  bilateral hilar and subcarinal lymph nodes stable. No mediastinal or hilar adenopathy. No axillary adenopathy.    CORONARY ARTERY CALCIFICATION: None.    UPPER ABDOMEN: Normal.    MUSCULOSKELETAL: No concerning osseous abnormalities.      Impression    IMPRESSION:  1.  No pulmonary embolism.    2.  Borderline bilateral hilar and subcarinal lymph nodes stable. No mediastinal or hilar adenopathy. No axillary adenopathy.     3.  Mild bronchial wall thickening in the posterior lower lungs.    4.  Dependent atelectasis in the posterior lung bases.       EKG: Normal sinus rhythm 86/min no other acute ST-T wave changes.         > 50 MINUTES SPENT BY ME on the date of service doing chart review, history, exam, documentation & further activities per the note.    Claudine Chapman MD  Hospitalist  [facility]

## 2023-07-13 NOTE — PHARMACY-ADMISSION MEDICATION HISTORY
Pharmacist Admission Medication History    Admission medication history is complete. The information provided in this note is only as accurate as the sources available at the time of the update.    Medication reconciliation/reorder completed by provider prior to medication history? Yes    Information Source(s): Patient and CareEverywhere/SureScripts via in-person    Pertinent Information:     Changes made to PTA medication list:    Added: [email or web address]    Deleted: None    Changed: None    Medication Affordability:       Allergies reviewed with patient and updates made in EHR: yes    Medication History Completed By: Eugenio Albert [facility] 7/13/2023 8:12 AM    Prior to Admission medications    Medication Sig Last Dose Taking? Auth Provider Long Term End Date   ADVAIR DISKUS 250-50 MCG/ACT inhaler Inhale 1 puff into the lungs 2 times daily 7/12/2023 Yes Unknown, Entered By History Yes    amphetamine-dextroamphetamine (ADDERALL) 30 MG tablet Take 30 mg by mouth 2 times daily Morning and afternoon 7/12/2023 Yes Unknown, Entered By History     atorvastatin (LIPITOR) 20 MG tablet Take 20 mg by mouth every morning 7/12/2023 Yes Unknown, Entered By History Yes    busPIRone (BUSPAR) 10 MG tablet Take 10 mg by mouth 3 times daily 7/12/2023 Yes Unknown, Entered By History Yes    fluticasone (FLONASE) 50 MCG/ACT nasal spray Spray 2 sprays into both nostrils At Bedtime 7/12/2023 Yes Unknown, Entered By History     gabapentin (NEURONTIN) 300 MG capsule Take 1,200 mg by mouth 3 times daily Patient has been on this dose for over one year 7/12/2023 Yes Unknown, Entered By History Yes    lamoTRIgine (LAMICTAL) 100 MG tablet Take 100 mg by mouth 2 times daily 7/12/2023 Yes Unknown, Entered By History Yes    LANTUS SOLOSTAR 100 UNIT/ML soln Inject 5 Units Subcutaneous At Bedtime 7/12/2023 Yes Unknown, Entered By History Yes    lisinopril-hydrochlorothiazide (ZESTORETIC) 10-12.5 MG tablet Take 1 tablet by mouth daily 7/12/2023 Yes Unknown,  Entered By History Yes    metFORMIN (GLUCOPHAGE) 1000 MG tablet Take 1,000 mg by mouth 2 times daily (with meals) 7/12/2023 Yes Unknown, Entered By History Yes    oxyCODONE-acetaminophen (PERCOCET)  MG per tablet Take 1-2 tablets by mouth every 8 hours as needed 7/12/2023 Yes Unknown, Entered By History     tamsulosin (FLOMAX) 0.4 MG capsule Take 0.8 mg by mouth daily (with lunch) 7/12/2023 Yes Unknown, Entered By History     testosterone (ANDROGEL 1 % PUMP) 12.5 MG/ACT (1%) gel Place 1 Pump onto the skin daily Shoulder/upper arm. 7/12/2023 Yes Unknown, Entered By History Yes    venlafaxine (EFFEXOR XR) 150 MG 24 hr capsule Take 150 mg by mouth At Bedtime 7/12/2023 Yes Unknown, Entered By History Yes    VENTOLIN  (90 Base) MCG/ACT inhaler Inhale 1-2 puffs into the lungs every 4 hours as needed Past Week Yes Unknown, Entered By History Yes

## 2023-07-13 NOTE — DISCHARGE SUMMARY
[facility] MEDICINE  DISCHARGE SUMMARY     Primary Care Physician: System, Provider Not In  Admission Date: 7/13/2023   Discharge Provider: Claudine Chapman MD Discharge Date: 7/13/2023   Diet: Orders Placed This Encounter      Moderate Consistent Carb (60 g CHO per Meal) Diet      Code Status: Full Code   Activity: activity as tolerated   [facility]      Condition at Discharge: Leaving AGAINST MEDICAL ADVICE     REASON FOR PRESENTATION(See Admission Note for Details)   Shortness of breath    PRINCIPAL & ACTIVE DISCHARGE DIAGNOSES     Principal Problem:  COPD exacerbation  Acute hypoxemic respiratory failure needing oxygen  Acute bronchitis    Hypokalemia  Active Problems:    Hypomagnesemia  History of prediabetes    SIGNIFICANT FINDINGS (Imaging, labs):     Most Recent 3 CBC's:Recent Labs   Lab Test 07/13/23  0627 07/13/23  0156 12/24/22  0359   WBC 7.3 9.2 12.6*   HGB 11.9* 12.7* 13.0*   MCV 85 85 90    203 284      Most Recent 3 BMP's:  Recent Labs   Lab Test 07/13/23  1219 07/13/23  1208 07/13/23  0906 07/13/23  0837 07/13/23  0644 07/13/23  0627 07/13/23  0156 12/24/22  0359   *  --  131*  --   --  129* 129* 138   POTASSIUM  --   --  3.6  --   --  3.4* 3.1* 3.9   CHLORIDE  --   --   --   --   --  89* 84* 91*   CO2  --   --   --   --   --  27 30 36*   BUN  --   --   --   --   --  13 13 8   CR  --   --   --   --   --  0.77 0.79 0.75   ANIONGAP  --   --   --   --   --  13 15 11   AKBAR  --   --   --   --   --  7.8* 8.0* 9.0   GLC  --  278*  --  188* 169* 171* 126* 100       Results for orders placed or performed during the hospital encounter of 07/13/23   XR Chest Port 1 View    Narrative    EXAM: XR CHEST PORT 1 VIEW  LOCATION: [facility]  DATE: 7/13/2023    INDICATION: dyspnea, cough, copd.  COMPARISON: 12/24/2022.    FINDINGS: The heart size is normal. There is enlargement of the right hilum which is a change from the previous exam. The  lungs are otherwise clear. There is no pneumothorax.      Impression    IMPRESSION: Right hilar consolidation or mass. Follow-up recommended.   CT Chest Pulmonary Embolism w Contrast    Narrative    EXAM: CT CHEST PULMONARY EMBOLISM W CONTRAST  LOCATION: [facility]  DATE: 7/13/2023    INDICATION: Suspect consolidation infection however eval mass and hypoxemia  COMPARISON: 12/24/2022  TECHNIQUE: CT chest pulmonary angiogram during arterial phase injection of IV contrast. Multiplanar reformats and MIP reconstructions were performed. Dose reduction techniques were used.   CONTRAST: [id number] 90ml    FINDINGS:  ANGIOGRAM CHEST: Pulmonary arteries are normal caliber and negative for pulmonary emboli. Thoracic aorta is negative for dissection. No CT evidence of right heart strain.    LUNGS AND PLEURA: Dependent atelectasis in the bilateral lung bases. Mild bronchial wall thickening in the posterior lower lungs. No pleural effusions. No focal lung infiltrates.    MEDIASTINUM/AXILLAE: Borderline bilateral hilar and subcarinal lymph nodes stable. No mediastinal or hilar adenopathy. No axillary adenopathy.    CORONARY ARTERY CALCIFICATION: None.    UPPER ABDOMEN: Normal.    MUSCULOSKELETAL: No concerning osseous abnormalities.      Impression    IMPRESSION:  1.  No pulmonary embolism.    2.  Borderline bilateral hilar and subcarinal lymph nodes stable. No mediastinal or hilar adenopathy. No axillary adenopathy.     3.  Mild bronchial wall thickening in the posterior lower lungs.    4.  Dependent atelectasis in the posterior lung bases.        PENDING LABS         PROCEDURES ( this hospitalization only)          RECOMMENDATION FOR F/U VISIT       DISPOSITION     Left AGAINST MEDICAL ADVICE    SUMMARY OF HOSPITAL COURSE:    53 year old male with a past medical history of COPD, not on home oxygen, hypertension, anxiety, adjustment disorder, low back pain who was admitted on 7/13/2023 for shortness of  breath, admitted for COPD exacerbation.  CT PE run negative for pulmonary embolism, showed bronchial wall thickening.  He was treated with nebs, steroids, ceftriaxone, azithromycin.  Afternoon he decided to leave AGAINST MEDICAL ADVICE after explaining the risks and benefits of needing oxygen and worsening respiratory status and death.  He verbalized understanding.  Prescriptions for prednisone, DuoNebs, cefdinir sent to his pharmacy.    Discharge Medications with Med changes:        Review of your medicines      UNREVIEWED medicines. Ask your doctor about these medicines      Dose / Directions   Advair Diskus 250-50 MCG/ACT inhaler  Generic drug: fluticasone-salmeterol      Dose: 1 puff  Inhale 1 puff into the lungs 2 times daily  Refills: 0     amphetamine-dextroamphetamine 30 MG tablet  Commonly known as: ADDERALL  Ask about: Which instructions should I use?      Dose: 30 mg  Take 30 mg by mouth 2 times daily Morning and afternoon  Refills: 0     atorvastatin 20 MG tablet  Commonly known as: LIPITOR      Dose: 20 mg  Take 20 mg by mouth every morning  Refills: 0     busPIRone 10 MG tablet  Commonly known as: BUSPAR      Dose: 10 mg  Take 10 mg by mouth 3 times daily  Refills: 0     fluticasone 50 MCG/ACT nasal spray  Commonly known as: FLONASE      Dose: 2 spray  Spray 2 sprays into both nostrils At Bedtime  Refills: 0     gabapentin 300 MG capsule  Commonly known as: NEURONTIN      Dose: 1,200 mg  Take 1,200 mg by mouth 3 times daily Patient has been on this dose for over one year  Refills: 0     lamoTRIgine 100 MG tablet  Commonly known as: LaMICtal      Dose: 100 mg  Take 100 mg by mouth 2 times daily  Refills: 0     Lantus SoloStar 100 UNIT/ML pen  Generic drug: insulin glargine      Dose: 5 Units  Inject 5 Units Subcutaneous At Bedtime  Refills: 0     lisinopril-hydrochlorothiazide 10-12.5 MG tablet  Commonly known as: ZESTORETIC      Dose: 1 tablet  Take 1 tablet by mouth daily  Refills: 0     metFORMIN  1000 MG tablet  Commonly known as: GLUCOPHAGE      Dose: 1,000 mg  Take 1,000 mg by mouth 2 times daily (with meals)  Refills: 0     oxyCODONE-acetaminophen  MG per tablet  Commonly known as: PERCOCET      Dose: 1-2 tablet  Take 1-2 tablets by mouth every 8 hours as needed  Refills: 0     tamsulosin 0.4 MG capsule  Commonly known as: FLOMAX      Dose: 0.8 mg  Take 0.8 mg by mouth daily (with lunch)  Refills: 0     testosterone 12.5 MG/ACT (1%) gel  Commonly known as: ANDROGEL 1 % PUMP      Dose: 1 Pump  Place 1 Pump onto the skin daily Shoulder/upper arm.  Refills: 0     venlafaxine 150 MG 24 hr capsule  Commonly known as: EFFEXOR XR      Dose: 150 mg  Take 150 mg by mouth At Bedtime  Refills: 0     Ventolin  (90 Base) MCG/ACT inhaler  Generic drug: albuterol      Dose: 1-2 puff  Inhale 1-2 puffs into the lungs every 4 hours as needed  Refills: 0        START taking      Dose / Directions   cefdinir 300 MG capsule  Commonly known as: OMNICEF  Used for: Community acquired bacterial pneumonia      Dose: 300 mg  Take 1 capsule (300 mg) by mouth 2 times daily  Quantity: 14 capsule  Refills: 0     ipratropium - albuterol 0.5 mg/2.5 mg/3 mL 0.5-2.5 (3) MG/3ML neb solution  Commonly known as: DUONEB  Used for: COPD with acute exacerbation (H)      Dose: 1 vial  Take 1 vial (3 mLs) by nebulization every 6 hours as needed for shortness of breath, wheezing or cough  Quantity: 90 mL  Refills: 0     predniSONE 20 MG tablet  Commonly known as: DELTASONE  Used for: COPD with acute exacerbation (H)      Dose: 40 mg  Take 2 tablets (40 mg) by mouth daily 40mg 2 days, 30mg 2 days, 20mg 2 days, 10mg 2 days  Quantity: 10 tablet  Refills: 0           Where to get your medicines      These medications were sent to [id number] - [place], [place] - [address] AT [facility] [address], [place] [address]    Hours: called pharm.  they do accept faxes  "Phone: [phone]   cefdinir 300 MG capsule  ipratropium - albuterol 0.5 mg/2.5 mg/3 mL 0.5-2.5 (3) MG/3ML neb solution  predniSONE 20 MG tablet              Rationale for medication changes:              Consults         Immunizations given this encounter     [unfilled]      No discharge procedures on file.  Examination     Vital Signs in last 24 hours:   Vital signs:  Temp: 98.5  F (36.9  C) Temp src: Oral BP: 121/57 Pulse: 74   Resp: 18 SpO2: 94 % O2 Device: Nasal cannula Oxygen Delivery: 2 LPM Height: 177.8 cm (5' 10\") Weight: 118.9 kg (262 lb 3.2 oz)  Estimated body mass index is 37.62 kg/m  as calculated from the following:    Height as of this encounter: 1.778 m (5' 10\").    Weight as of this encounter: 118.9 kg (262 lb 3.2 oz).       Pertinent positives on exam:  Lungs: Wheezing bilaterally    Please see EMR for more detailed significant labs, imaging, consultant notes etc.  Total time spent on discharge: > 35 minutes    Claudine Chapman MD   [facility] Hospitalist Service: Ph:[phone]    CC:System, Provider Not In      "

## 2023-07-13 NOTE — SIGNIFICANT EVENT
Patient took off his oxygen and decided to leave AMA.  I was notified and went in talk with the patient.  He did not specify reason, and mentioned that he has to leave the hospital.  Requested refills on his nebulizers, prescriptions for prednisone, cefdinir, DuoNeb sent to his pharmacy.  Patient signed AMA form    Claudine Chapman MD.

## 2023-07-13 NOTE — ED PROVIDER NOTES
NAME: Rogelio Belle  AGE: 53 year old male  YOB: 1970  MRN: [id number]  EVALUATION DATE & TIME: 2023  1:00 AM    PCP: System, Provider Not In    ED PROVIDER: Reji Duenas M.D.      Chief Complaint   Patient presents with     Shortness of Breath     Cough     Fever     FINAL IMPRESSION:  1. COPD with acute exacerbation (H)    2. Community acquired bacterial pneumonia    3. Hypomagnesemia    4. Hypokalemia      MEDICAL DECISION MAKIN:26 AM I met with the patient, obtained history, performed an initial exam, and discussed options and plan for diagnostics and treatment here in the ED.   Patient was clinically assessed and consented to treatment. After assessment, medical decision making and workup were discussed with the patient. The patient was agreeable to plan for testing, workup, and treatment.  Pertinent Labs & Imaging studies reviewed. (See chart for details)     Medical Decision Making    History:    Supplemental history from: Documented in chart, if applicable    External Record(s) reviewed: Documented in chart, if applicable.    Work Up:    Chart documentation includes differential considered and any EKGs or imaging independently interpreted by provider, where specified.    In additional to work up documented, I considered the following work up: Documented in chart, if applicable.    External consultation:    Discussion of management with another provider: Documented in chart, if applicable    Complicating factors:    Care impacted by chronic illness: Chronic Lung Disease, Diabetes and Hypertension    Care affected by social determinants of health: N/A    Disposition considerations: Admit.    Rogelio Belle is a 53 year old male who presents with shortness breath, cough, fever.   Differential diagnosis includes but not limited to pneumonia, COVID-19, COPD exacerbation, bronchitis, hypoxemia, hypercapnia, respiratory distress.  Patient not in respiratory distress but  definitely with increased work of breathing and slightly hypoxic at 88% on room air.  Patient does have a history of COPD and this could be his baseline however given the work of breathing I do feel likely patient saturations are better than this.  Temperature was close to 103 and patient with body aches now with 5 days of body aches, cough, congestion, and shortness of breath.  I suspect patient likely with COPD exacerbation possibly from COVID-19 however lung sounds are diminished at midlung and below with some wheezing above that.  DuoNeb and albuterol will be started along with Solu-Medrol, a little bit IV fluids given patient's fever and dry appearance clinically.  Additionally with fever patient was given Tylenol as well as a dose of Toradol to help bring his fever down.  Patient will be rechecked for improvement in his breathing and will try to wean oxygen as tolerated however I did warn patient he may need admission to reverse his suspected COPD exacerbation with bronchitis and possibly pneumonia.  Laboratory exams interpreted by myself showed slight leukocytosis but stable hemoglobin.  Troponin was negative and lactate slightly elevated consistent with infectious process.  Procalcitonin was not elevated though and could be viral with COPD exacerbation.  Metabolic panel did return with some significant alterations possibly related to the respiratory problems and electrolyte shifts.  Potassium was low and magnesium was low.  Magnesium will be repleted with 4 g was ordered along with IV and oral potassium.  IV fluids were also given gingerly but I do not want to dilute her magnesium too much given how low it is.  Patient chest x-ray did show perihilar infiltrate and will start patient on antibiotics after blood cultures obtained.  Patient breathing better after the nebulized treatments and Solu-Medrol.  Patient still having some diminished sounds in the bases but improved.  No wheezing at this time.  Patient  and I discussed the x-ray findings and electrolyte abnormalities and patient agreeable admission.  He was discussed with hospitalist and agreeable to cardiac telemetry admission given the electrolyte abnormalities.  Patient also will be checked for CT given the consolidation/mass appearance of the infiltrate though unlikely be mass given that he has CTA several months ago which showed no mass and unlikely to grow over that quickly.  Patient comfortable at this time still requiring oxygen will be plan for admission to cardiac telemetry.    0 minutes of critical care time    MEDICATIONS GIVEN IN THE EMERGENCY:  Medications   albuterol (PROVENTIL) neb solution 2.5 mg (has no administration in time range)   magnesium sulfate 2 g in 50 mL sterile water intermittent infusion (has no administration in time range)   azithromycin 500 mg (ZITHROMAX) in 0.9% NaCl 250 mL intermittent infusion 500 mg (500 mg Intravenous $New Bag 7/13/23 1406)   lidocaine 1 % 0.1-1 mL (has no administration in time range)   lidocaine (LMX4) cream (has no administration in time range)   sodium chloride (PF) 0.9% PF flush 3 mL (has no administration in time range)   sodium chloride (PF) 0.9% PF flush 3 mL (has no administration in time range)   melatonin tablet 1 mg (has no administration in time range)   sodium chloride 0.9% infusion (has no administration in time range)   acetaminophen (TYLENOL) tablet 650 mg (has no administration in time range)     Or   acetaminophen (TYLENOL) Suppository 650 mg (has no administration in time range)   cefTRIAXone (ROCEPHIN) 2 g vial to attach to  ml bag for ADULTS or NS 50 ml bag for PEDS (has no administration in time range)   benzonatate (TESSALON) capsule 100 mg (has no administration in time range)   guaiFENesin (ROBITUSSIN) 20 mg/mL solution 200 mg (has no administration in time range)   ipratropium - albuterol 0.5 mg/2.5 mg/3 mL (DUONEB) neb solution 3 mL (has no administration in time range)    ipratropium - albuterol 0.5 mg/2.5 mg/3 mL (DUONEB) neb solution 3 mL (has no administration in time range)   HYDROmorphone (DILAUDID) injection 0.2 mg (has no administration in time range)   HYDROcodone-acetaminophen (NORCO) 5-325 MG per tablet 1 tablet (has no administration in time range)   0.9% sodium chloride BOLUS (has no administration in time range)   glucose gel 15-30 g (has no administration in time range)     Or   dextrose 50 % injection 25-50 mL (has no administration in time range)     Or   glucagon injection 1 mg (has no administration in time range)   insulin aspart (NovoLOG) injection (RAPID ACTING) (has no administration in time range)   insulin aspart (NovoLOG) injection (RAPID ACTING) (has no administration in time range)   methylPREDNISolone sodium succinate (solu-MEDROL) injection 62.5 mg (has no administration in time range)   ketorolac (TORADOL) injection 15 mg (15 mg Intravenous $Given 7/13/23 0159)   acetaminophen (TYLENOL) tablet 650 mg (650 mg Oral $Given 7/13/23 0159)   0.9% sodium chloride BOLUS (0 mLs Intravenous Stopped 7/13/23 0308)   ipratropium - albuterol 0.5 mg/2.5 mg/3 mL (DUONEB) neb solution 3 mL (3 mLs Nebulization $Given 7/13/23 0133)   methylPREDNISolone sodium succinate (solu-MEDROL) injection 125 mg (125 mg Intravenous $Given 7/13/23 0159)   potassium chloride ER (KLOR-CON ) CR tablet 40 mEq (40 mEq Oral $Given 7/13/23 0338)   potassium chloride 10 mEq in 100 mL sterile water infusion (10 mEq Intravenous $New Bag 7/13/23 0350)   magnesium sulfate 2 g in 50 mL sterile water intermittent infusion (2 g Intravenous $New Bag 7/13/23 0349)   cefTRIAXone (ROCEPHIN) 2 g vial to attach to  ml bag for ADULTS or NS 50 ml bag for PEDS (0 g Intravenous Stopped 7/13/23 0438)   iopamidol (ISOVUE-370) solution 90 mL (90 mLs Intravenous $Given 7/13/23 0429)       NEW PRESCRIPTIONS STARTED AT TODAY'S ER VISIT:  New Prescriptions    No medications on file           =================================================================    HPI    Patient information was obtained from: Patient    Use of : N/A      Rogelio Belle is a 53 year old male with a past medical history of DM II, COPD, and HTN , who presents with shortness of breath.    The patient reports five days of a headache, shortness of breath, cough, congestion, and fever.  He reports his symptoms are getting worse.  He also is reporting chest tightness that he attributes to his cough.  He reports a history of asthma and has been using his rescue inhaler with minimal help.  He has a history of COPD as ell.  He took inhaler, Tylenol, and Oxycodone earlier this morning and evening without relief.  He has not tested for Covid-19 yet.  He denies any abdominal pain, nausea, vomiting, or other complaints at this time.      REVIEW OF SYSTEMS   Review of Systems   Constitutional: Positive for chills and fever.   HENT: Positive for congestion.    Respiratory: Positive for cough, chest tightness, shortness of breath and wheezing.    Cardiovascular: Negative for chest pain, palpitations and leg swelling.   Gastrointestinal: Negative for abdominal pain, diarrhea, nausea and vomiting.   Musculoskeletal: Positive for myalgias.   Neurological: Negative for dizziness, weakness and light-headedness.   All other systems reviewed and are negative.       PAST MEDICAL HISTORY:  Past Medical History:   Diagnosis Date     COPD (chronic obstructive pulmonary disease) (H)      Diabetes (H)        PAST SURGICAL HISTORY:  No past surgical history on file.    CURRENT MEDICATIONS:      Current Facility-Administered Medications:      0.9% sodium chloride BOLUS, 500 mL, Intravenous, Once, Laz Dang MD     acetaminophen (TYLENOL) tablet 650 mg, 650 mg, Oral, Q4H PRN **OR** acetaminophen (TYLENOL) Suppository 650 mg, 650 mg, Rectal, Q6H PRN, Laz Dang MD     albuterol (PROVENTIL) neb solution 2.5 mg, 2.5 mg,  Nebulization, Once PRN, Reji Duenas MD     azithromycin 500 mg (ZITHROMAX) in 0.9% NaCl 250 mL intermittent infusion 500 mg, 500 mg, Intravenous, Once, Reji Duenas MD, 500 mg at 07/13/23 0457     benzonatate (TESSALON) capsule 100 mg, 100 mg, Oral, TID PRN, Laz Dang MD     [START ON 7/14/2023] cefTRIAXone (ROCEPHIN) 2 g vial to attach to  ml bag for ADULTS or NS 50 ml bag for PEDS, 2 g, Intravenous, Q24H, Laz Dang MD     glucose gel 15-30 g, 15-30 g, Oral, Q15 Min PRN **OR** dextrose 50 % injection 25-50 mL, 25-50 mL, Intravenous, Q15 Min PRN **OR** glucagon injection 1 mg, 1 mg, Subcutaneous, Q15 Min PRN, Laz Dang MD     guaiFENesin (ROBITUSSIN) 20 mg/mL solution 200 mg, 200 mg, Oral, Q4H PRN, Laz Dang MD     HYDROcodone-acetaminophen (NORCO) 5-325 MG per tablet 1 tablet, 1 tablet, Oral, Q4H PRN, Laz Dang MD     HYDROmorphone (DILAUDID) injection 0.2 mg, 0.2 mg, Intravenous, Q4H PRN, Laz Dang MD     insulin aspart (NovoLOG) injection (RAPID ACTING), 1-7 Units, Subcutaneous, TID AC, Laz Dang MD     insulin aspart (NovoLOG) injection (RAPID ACTING), 1-5 Units, Subcutaneous, At Bedtime, Laz Dang MD     ipratropium - albuterol 0.5 mg/2.5 mg/3 mL (DUONEB) neb solution 3 mL, 3 mL, Nebulization, 4x daily, Laz Dang MD     ipratropium - albuterol 0.5 mg/2.5 mg/3 mL (DUONEB) neb solution 3 mL, 3 mL, Nebulization, Q2H PRN, Laz Dang MD     lidocaine (LMX4) cream, , Topical, Q1H PRN, Laz Dang MD     lidocaine 1 % 0.1-1 mL, 0.1-1 mL, Other, Q1H PRN, Laz Dang MD     magnesium sulfate 2 g in 50 mL sterile water intermittent infusion, 2 g, Intravenous, Once, Reji Duenas MD     melatonin tablet 1 mg, 1 mg, Oral, At Bedtime PRN, Laz Dang MD     methylPREDNISolone sodium succinate (solu-MEDROL) injection 62.5 mg, 62.5 mg, Intravenous, Q8H, [place]  MD Laz     sodium chloride (PF) 0.9% PF flush 3 mL, 3 mL, Intracatheter, Q8H, Laz Dang MD     sodium chloride (PF) 0.9% PF flush 3 mL, 3 mL, Intracatheter, q1 min prn, Laz Dang MD     sodium chloride 0.9% infusion, , Intravenous, Continuous, Laz Dang MD    Current Outpatient Medications:      ADDERALL XR 30 MG 24 hr capsule, Take 30 mg by mouth 2 times daily Morning and Afternoon, Disp: , Rfl:      ADVAIR DISKUS 250-50 MCG/ACT inhaler, Inhale 1 puff into the lungs 2 times daily, Disp: , Rfl:      atorvastatin (LIPITOR) 20 MG tablet, Take 20 mg by mouth every morning, Disp: , Rfl:      busPIRone (BUSPAR) 10 MG tablet, Take 10 mg by mouth 3 times daily, Disp: , Rfl:      fluticasone (FLONASE) 50 MCG/ACT nasal spray, Spray 2 sprays into both nostrils At Bedtime, Disp: , Rfl:      gabapentin (NEURONTIN) 300 MG capsule, Take 600 mg by mouth 4 times daily, Disp: , Rfl:      lamoTRIgine (LAMICTAL) 100 MG tablet, Take 100 mg by mouth 2 times daily, Disp: , Rfl:      LANTUS SOLOSTAR 100 UNIT/ML soln, Inject 5 Units Subcutaneous At Bedtime, Disp: , Rfl:      lisinopril-hydrochlorothiazide (ZESTORETIC) 10-12.5 MG tablet, Take 1 tablet by mouth daily, Disp: , Rfl:      metFORMIN (GLUCOPHAGE) 1000 MG tablet, Take 1,000 mg by mouth 2 times daily (with meals), Disp: , Rfl:      oxyCODONE-acetaminophen (PERCOCET)  MG per tablet, Take 1-2 tablets by mouth every 8 hours as needed, Disp: , Rfl:      tamsulosin (FLOMAX) 0.4 MG capsule, Take 0.8 mg by mouth daily (with lunch), Disp: , Rfl:      venlafaxine (EFFEXOR XR) 150 MG 24 hr capsule, Take 150 mg by mouth At Bedtime, Disp: , Rfl:      VENTOLIN  (90 Base) MCG/ACT inhaler, Inhale 1-2 puffs into the lungs every 4 hours as needed, Disp: , Rfl:     ALLERGIES:  No Known Allergies    FAMILY HISTORY:  No family history on file.    SOCIAL HISTORY:   Social History     Socioeconomic History     Marital status:    Tobacco Use     Smoking  "status: Every Day     Packs/day: 1.00     Types: Cigarettes   Substance and Sexual Activity     Alcohol use: Not Currently       PHYSICAL EXAM:    Vitals: /63   Pulse 85   Temp 98.7  F (37.1  C) (Oral)   Resp 21   Ht 1.778 m (5' 10\")   Wt 119.3 kg (263 lb)   SpO2 91%   BMI 37.74 kg/m     Physical Exam  Vitals and nursing note reviewed.   Constitutional:       General: He is not in acute distress.     Appearance: He is well-developed and normal weight. He is ill-appearing. He is not toxic-appearing or diaphoretic.   HENT:      Head: Normocephalic.      Mouth/Throat:      Comments: Dry mucous membranes  Neck:      Vascular: No JVD.   Cardiovascular:      Rate and Rhythm: Normal rate and regular rhythm.      Pulses: Normal pulses.      Heart sounds: Normal heart sounds.   Pulmonary:      Effort: Tachypnea and accessory muscle usage present. No respiratory distress.      Breath sounds: No stridor. Examination of the right-middle field reveals decreased breath sounds and wheezing. Examination of the left-middle field reveals decreased breath sounds and wheezing. Examination of the right-lower field reveals decreased breath sounds. Examination of the left-lower field reveals decreased breath sounds. Decreased breath sounds and wheezing present. No rhonchi or rales.   Chest:      Chest wall: No tenderness.   Abdominal:      Palpations: Abdomen is soft.      Tenderness: There is no abdominal tenderness.   Musculoskeletal:      Cervical back: Normal range of motion and neck supple.      Right lower leg: No tenderness. Edema (Trace lower extremity edema bilaterally.) present.      Left lower leg: No tenderness. Edema present.   Skin:     General: Skin is warm and dry.      Coloration: Skin is not cyanotic or pale.   Neurological:      General: No focal deficit present.      Mental Status: He is alert.   Psychiatric:         Behavior: Behavior normal.           LAB:  All pertinent labs reviewed and " interpreted.  Labs Ordered and Resulted from Time of ED Arrival to Time of ED Departure   COMPREHENSIVE METABOLIC PANEL - Abnormal       Result Value    Sodium 129 (*)     Potassium 3.1 (*)     Chloride 84 (*)     Carbon Dioxide (CO2) 30      Anion Gap 15      Urea Nitrogen 13      Creatinine 0.79      Calcium 8.0 (*)     Glucose 126 (*)     Alkaline Phosphatase 56      AST 25      ALT 16      Protein Total 6.9      Albumin 3.3 (*)     Bilirubin Total 0.7      GFR Estimate >90     MAGNESIUM - Abnormal    Magnesium 0.8 (*)    CBC WITH PLATELETS AND DIFFERENTIAL - Abnormal    WBC Count 9.2      RBC Count 4.34 (*)     Hemoglobin 12.7 (*)     Hematocrit 37.0 (*)     MCV 85      MCH 29.3      MCHC 34.3      RDW 13.9      Platelet Count 203      % Neutrophils 71      % Lymphocytes 13      % Monocytes 16      % Eosinophils 0      % Basophils 0      % Immature Granulocytes 0      NRBCs per 100 WBC 0      Absolute Neutrophils 6.4      Absolute Lymphocytes 1.2      Absolute Monocytes 1.5 (*)     Absolute Eosinophils 0.0      Absolute Basophils 0.0      Absolute Immature Granulocytes 0.0      Absolute NRBCs 0.0     LACTIC ACID WHOLE BLOOD - Abnormal    Lactic Acid 2.7 (*)    COVID-19 VIRUS (CORONAVIRUS) BY PCR - Normal    SARS CoV2 PCR Negative     TROPONIN I - Normal    Troponin I <0.01     PROCALCITONIN - Normal    Procalcitonin 0.21     B-TYPE NATRIURETIC PEPTIDE ([facility] ONLY) - Normal    BNP <10     LACTIC ACID WHOLE BLOOD   COMPREHENSIVE METABOLIC PANEL   ROUTINE UA WITH MICROSCOPIC REFLEX TO CULTURE   PROCALCITONIN   TROPONIN I   GLUCOSE MONITOR NURSING POCT   GLUCOSE MONITOR NURSING POCT   GLUCOSE MONITOR NURSING POCT   GLUCOSE MONITOR NURSING POCT   GLUCOSE MONITOR NURSING POCT   BLOOD GAS VENOUS   BLOOD CULTURE   BLOOD CULTURE   RESPIRATORY AEROBIC BACTERIAL CULTURE   RESPIRATORY PANEL PCR   LEGIONELLA PNEUMOPHILA URINARY ANTIGEN   STREPTOCOCCUS PNEUMONIAE ANTIGEN       RADIOLOGY:  CT Chest Pulmonary Embolism w  Contrast   Final Result   IMPRESSION:   1.  No pulmonary embolism.      2.  Borderline bilateral hilar and subcarinal lymph nodes stable. No mediastinal or hilar adenopathy. No axillary adenopathy.       3.  Mild bronchial wall thickening in the posterior lower lungs.      4.  Dependent atelectasis in the posterior lung bases.      XR Chest Port 1 View   Final Result   IMPRESSION: Right hilar consolidation or mass. Follow-up recommended.        EKG:   Performed at: 1:15 AM on July 13, 2023.  Impression: Sinus rhythm with otherwise normal EKG, no signs of acute ST elevation ischemia, no irregular T waves or irregular rhythm.  Rate: 86 bpm  Rhythm: Sinus rhythm  QRS Interval: 92 ms  QTc Interval: 471 ms  Comparison: Comparison prior EKG from December 24, 2022 with no acute ischemic changes and resolution of nonspecific T wave abnormalities seen at that time.  I have independently reviewed and interpreted the EKG(s) documented above.     PROCEDURES:   Procedures       I, Sumit Ward, am serving as a scribe to document services personally performed by Dr. Reji Duenas  based on my observation and the provider's statements to me. I, Reji Duenas MD attest that Sumit Ward is acting in a scribe capacity, has observed my performance of the services and has documented them in accordance with my direction.      Reji Duenas M.D.  Emergency Medicine  [facility] Emergency Department      Reji Duenas MD  07/13/23 8202

## 2023-07-15 LAB
BACTERIA SPT CULT: NORMAL
GRAM STAIN RESULT: NORMAL

## 2023-07-18 LAB
BACTERIA BLD CULT: NO GROWTH
BACTERIA BLD CULT: NO GROWTH

## 2023-10-15 ENCOUNTER — HEALTH MAINTENANCE LETTER (OUTPATIENT)
Age: 53
End: 2023-10-15

## 2024-03-03 ENCOUNTER — HEALTH MAINTENANCE LETTER (OUTPATIENT)
Age: 54
End: 2024-03-03

## 2024-04-23 ENCOUNTER — HOSPITAL ENCOUNTER (OUTPATIENT)
Facility: CLINIC | Age: 54
Discharge: HOME | End: 2024-04-24
Attending: ORTHOPAEDIC SURGERY | Admitting: ORTHOPAEDIC SURGERY
Payer: COMMERCIAL

## 2024-04-23 ENCOUNTER — APPOINTMENT (OUTPATIENT)
Dept: RADIOLOGY | Facility: CLINIC | Age: 54
End: 2024-04-23
Attending: ORTHOPAEDIC SURGERY
Payer: COMMERCIAL

## 2024-04-23 ENCOUNTER — ANESTHESIA (OUTPATIENT)
Dept: SURGERY | Facility: CLINIC | Age: 54
End: 2024-04-23
Payer: COMMERCIAL

## 2024-04-23 ENCOUNTER — ANESTHESIA EVENT (OUTPATIENT)
Dept: SURGERY | Facility: CLINIC | Age: 54
End: 2024-04-23
Payer: COMMERCIAL

## 2024-04-23 DIAGNOSIS — G89.18 ACUTE POST-OPERATIVE PAIN: ICD-10-CM

## 2024-04-23 DIAGNOSIS — M48.061 LUMBAR SPINAL STENOSIS: Primary | ICD-10-CM

## 2024-04-23 LAB
GLUCOSE BLDC GLUCOMTR-MCNC: 100 MG/DL (ref 70–99)
GLUCOSE BLDC GLUCOMTR-MCNC: 145 MG/DL (ref 70–99)
GLUCOSE BLDC GLUCOMTR-MCNC: 158 MG/DL (ref 70–99)
GLUCOSE BLDC GLUCOMTR-MCNC: 195 MG/DL (ref 70–99)

## 2024-04-23 PROCEDURE — 250N000025 HC SEVOFLURANE, PER MIN: Performed by: ORTHOPAEDIC SURGERY

## 2024-04-23 PROCEDURE — 250N000012 HC RX MED GY IP 250 OP 636 PS 637: Performed by: STUDENT IN AN ORGANIZED HEALTH CARE EDUCATION/TRAINING PROGRAM

## 2024-04-23 PROCEDURE — 250N000005 HC OR RX SURGIFLO HEMOSTATIC MATRIX 10ML 199102S OPNP: Performed by: ORTHOPAEDIC SURGERY

## 2024-04-23 PROCEDURE — 82962 GLUCOSE BLOOD TEST: CPT

## 2024-04-23 PROCEDURE — 272N000001 HC OR GENERAL SUPPLY STERILE: Performed by: ORTHOPAEDIC SURGERY

## 2024-04-23 PROCEDURE — 360N000084 HC SURGERY LEVEL 4 W/ FLUORO, PER MIN: Performed by: ORTHOPAEDIC SURGERY

## 2024-04-23 PROCEDURE — 250N000011 HC RX IP 250 OP 636: Performed by: PHYSICIAN ASSISTANT

## 2024-04-23 PROCEDURE — 99215 OFFICE O/P EST HI 40 MIN: CPT | Performed by: STUDENT IN AN ORGANIZED HEALTH CARE EDUCATION/TRAINING PROGRAM

## 2024-04-23 PROCEDURE — 250N000011 HC RX IP 250 OP 636: Performed by: NURSE ANESTHETIST, CERTIFIED REGISTERED

## 2024-04-23 PROCEDURE — 258N000003 HC RX IP 258 OP 636: Performed by: ANESTHESIOLOGY

## 2024-04-23 PROCEDURE — 250N000009 HC RX 250: Performed by: ORTHOPAEDIC SURGERY

## 2024-04-23 PROCEDURE — 250N000013 HC RX MED GY IP 250 OP 250 PS 637: Performed by: ANESTHESIOLOGY

## 2024-04-23 PROCEDURE — 250N000011 HC RX IP 250 OP 636: Performed by: ANESTHESIOLOGY

## 2024-04-23 PROCEDURE — 370N000017 HC ANESTHESIA TECHNICAL FEE, PER MIN: Performed by: ORTHOPAEDIC SURGERY

## 2024-04-23 PROCEDURE — 999N000141 HC STATISTIC PRE-PROCEDURE NURSING ASSESSMENT: Performed by: ORTHOPAEDIC SURGERY

## 2024-04-23 PROCEDURE — 250N000009 HC RX 250: Performed by: NURSE ANESTHETIST, CERTIFIED REGISTERED

## 2024-04-23 PROCEDURE — 999N000182 XR SURGERY CARM FLUORO GREATER THAN 5 MIN: Mod: TC

## 2024-04-23 PROCEDURE — 250N000013 HC RX MED GY IP 250 OP 250 PS 637: Performed by: STUDENT IN AN ORGANIZED HEALTH CARE EDUCATION/TRAINING PROGRAM

## 2024-04-23 PROCEDURE — 710N000010 HC RECOVERY PHASE 1, LEVEL 2, PER MIN: Performed by: ORTHOPAEDIC SURGERY

## 2024-04-23 PROCEDURE — 250N000013 HC RX MED GY IP 250 OP 250 PS 637: Performed by: PHYSICIAN ASSISTANT

## 2024-04-23 RX ORDER — TAMSULOSIN HYDROCHLORIDE 0.4 MG/1
0.8 CAPSULE ORAL
Status: DISCONTINUED | OUTPATIENT
Start: 2024-04-24 | End: 2024-04-24 | Stop reason: HOSPADM

## 2024-04-23 RX ORDER — CEFAZOLIN SODIUM/WATER 2 G/20 ML
2 SYRINGE (ML) INTRAVENOUS
Status: COMPLETED | OUTPATIENT
Start: 2024-04-23 | End: 2024-04-23

## 2024-04-23 RX ORDER — DEXTROSE MONOHYDRATE 25 G/50ML
25-50 INJECTION, SOLUTION INTRAVENOUS
Status: DISCONTINUED | OUTPATIENT
Start: 2024-04-23 | End: 2024-04-23

## 2024-04-23 RX ORDER — LISINOPRIL/HYDROCHLOROTHIAZIDE 10-12.5 MG
1 TABLET ORAL DAILY
Status: DISCONTINUED | OUTPATIENT
Start: 2024-04-24 | End: 2024-04-24 | Stop reason: HOSPADM

## 2024-04-23 RX ORDER — MAGNESIUM HYDROXIDE 1200 MG/15ML
LIQUID ORAL PRN
Status: DISCONTINUED | OUTPATIENT
Start: 2024-04-23 | End: 2024-04-23 | Stop reason: HOSPADM

## 2024-04-23 RX ORDER — NALOXONE HYDROCHLORIDE 0.4 MG/ML
0.2 INJECTION, SOLUTION INTRAMUSCULAR; INTRAVENOUS; SUBCUTANEOUS
Status: DISCONTINUED | OUTPATIENT
Start: 2024-04-23 | End: 2024-04-24 | Stop reason: HOSPADM

## 2024-04-23 RX ORDER — ACETAMINOPHEN 325 MG/1
975 TABLET ORAL ONCE
Status: COMPLETED | OUTPATIENT
Start: 2024-04-23 | End: 2024-04-23

## 2024-04-23 RX ORDER — ACETAMINOPHEN 325 MG/1
975 TABLET ORAL EVERY 8 HOURS
Qty: 27 TABLET | Refills: 0 | Status: DISCONTINUED | OUTPATIENT
Start: 2024-04-23 | End: 2024-04-24 | Stop reason: HOSPADM

## 2024-04-23 RX ORDER — DIPHENHYDRAMINE HYDROCHLORIDE 50 MG/ML
25 INJECTION INTRAMUSCULAR; INTRAVENOUS EVERY 6 HOURS PRN
Status: DISCONTINUED | OUTPATIENT
Start: 2024-04-23 | End: 2024-04-23 | Stop reason: HOSPADM

## 2024-04-23 RX ORDER — DIPHENHYDRAMINE HCL 25 MG
25 CAPSULE ORAL EVERY 6 HOURS PRN
Status: DISCONTINUED | OUTPATIENT
Start: 2024-04-23 | End: 2024-04-23 | Stop reason: HOSPADM

## 2024-04-23 RX ORDER — LIDOCAINE HYDROCHLORIDE 10 MG/ML
INJECTION, SOLUTION INFILTRATION; PERINEURAL PRN
Status: DISCONTINUED | OUTPATIENT
Start: 2024-04-23 | End: 2024-04-23

## 2024-04-23 RX ORDER — HYDROMORPHONE HCL IN WATER/PF 6 MG/30 ML
0.2 PATIENT CONTROLLED ANALGESIA SYRINGE INTRAVENOUS EVERY 5 MIN PRN
Status: DISCONTINUED | OUTPATIENT
Start: 2024-04-23 | End: 2024-04-23 | Stop reason: HOSPADM

## 2024-04-23 RX ORDER — DEXMEDETOMIDINE HYDROCHLORIDE 4 UG/ML
INJECTION, SOLUTION INTRAVENOUS PRN
Status: DISCONTINUED | OUTPATIENT
Start: 2024-04-23 | End: 2024-04-23

## 2024-04-23 RX ORDER — FLUTICASONE PROPIONATE AND SALMETEROL 250; 50 UG/1; UG/1
1 POWDER RESPIRATORY (INHALATION) 2 TIMES DAILY PRN
Status: DISCONTINUED | OUTPATIENT
Start: 2024-04-23 | End: 2024-04-24 | Stop reason: HOSPADM

## 2024-04-23 RX ORDER — CEFAZOLIN SODIUM/WATER 2 G/20 ML
2 SYRINGE (ML) INTRAVENOUS SEE ADMIN INSTRUCTIONS
Status: DISCONTINUED | OUTPATIENT
Start: 2024-04-23 | End: 2024-04-23 | Stop reason: HOSPADM

## 2024-04-23 RX ORDER — LIDOCAINE 40 MG/G
CREAM TOPICAL
Status: DISCONTINUED | OUTPATIENT
Start: 2024-04-23 | End: 2024-04-24 | Stop reason: HOSPADM

## 2024-04-23 RX ORDER — BUPIVACAINE HYDROCHLORIDE AND EPINEPHRINE 2.5; 5 MG/ML; UG/ML
INJECTION, SOLUTION EPIDURAL; INFILTRATION; INTRACAUDAL; PERINEURAL
Status: DISCONTINUED
Start: 2024-04-23 | End: 2024-04-23 | Stop reason: HOSPADM

## 2024-04-23 RX ORDER — ONDANSETRON 4 MG/1
4 TABLET, ORALLY DISINTEGRATING ORAL EVERY 6 HOURS PRN
Status: DISCONTINUED | OUTPATIENT
Start: 2024-04-23 | End: 2024-04-24 | Stop reason: HOSPADM

## 2024-04-23 RX ORDER — NALOXONE HYDROCHLORIDE 0.4 MG/ML
0.4 INJECTION, SOLUTION INTRAMUSCULAR; INTRAVENOUS; SUBCUTANEOUS
Status: DISCONTINUED | OUTPATIENT
Start: 2024-04-23 | End: 2024-04-24 | Stop reason: HOSPADM

## 2024-04-23 RX ORDER — VARENICLINE TARTRATE 1 MG/1
1 TABLET, FILM COATED ORAL 2 TIMES DAILY
COMMUNITY
Start: 2024-03-07

## 2024-04-23 RX ORDER — NALOXONE HYDROCHLORIDE 0.4 MG/ML
0.1 INJECTION, SOLUTION INTRAMUSCULAR; INTRAVENOUS; SUBCUTANEOUS
Status: DISCONTINUED | OUTPATIENT
Start: 2024-04-23 | End: 2024-04-23 | Stop reason: HOSPADM

## 2024-04-23 RX ORDER — HYDROMORPHONE HCL IN WATER/PF 6 MG/30 ML
0.4 PATIENT CONTROLLED ANALGESIA SYRINGE INTRAVENOUS EVERY 5 MIN PRN
Status: DISCONTINUED | OUTPATIENT
Start: 2024-04-23 | End: 2024-04-23 | Stop reason: HOSPADM

## 2024-04-23 RX ORDER — DEXTROSE MONOHYDRATE 25 G/50ML
25-50 INJECTION, SOLUTION INTRAVENOUS
Status: DISCONTINUED | OUTPATIENT
Start: 2024-04-23 | End: 2024-04-24 | Stop reason: HOSPADM

## 2024-04-23 RX ORDER — DIAZEPAM 10 MG/2ML
2.5 INJECTION, SOLUTION INTRAMUSCULAR; INTRAVENOUS
Status: DISCONTINUED | OUTPATIENT
Start: 2024-04-23 | End: 2024-04-23 | Stop reason: HOSPADM

## 2024-04-23 RX ORDER — ONDANSETRON 2 MG/ML
INJECTION INTRAMUSCULAR; INTRAVENOUS PRN
Status: DISCONTINUED | OUTPATIENT
Start: 2024-04-23 | End: 2024-04-23

## 2024-04-23 RX ORDER — METHOCARBAMOL 750 MG/1
750 TABLET, FILM COATED ORAL EVERY 6 HOURS PRN
Status: DISCONTINUED | OUTPATIENT
Start: 2024-04-23 | End: 2024-04-24

## 2024-04-23 RX ORDER — AMOXICILLIN 250 MG
1 CAPSULE ORAL 2 TIMES DAILY
Status: DISCONTINUED | OUTPATIENT
Start: 2024-04-23 | End: 2024-04-24 | Stop reason: HOSPADM

## 2024-04-23 RX ORDER — HYDROMORPHONE HCL IN WATER/PF 6 MG/30 ML
0.2 PATIENT CONTROLLED ANALGESIA SYRINGE INTRAVENOUS
Status: DISCONTINUED | OUTPATIENT
Start: 2024-04-23 | End: 2024-04-24 | Stop reason: HOSPADM

## 2024-04-23 RX ORDER — NICOTINE POLACRILEX 4 MG
15-30 LOZENGE BUCCAL
Status: DISCONTINUED | OUTPATIENT
Start: 2024-04-23 | End: 2024-04-24 | Stop reason: HOSPADM

## 2024-04-23 RX ORDER — MAGNESIUM SULFATE 4 G/50ML
4 INJECTION INTRAVENOUS ONCE
Status: COMPLETED | OUTPATIENT
Start: 2024-04-23 | End: 2024-04-23

## 2024-04-23 RX ORDER — POLYETHYLENE GLYCOL 3350 17 G/17G
17 POWDER, FOR SOLUTION ORAL DAILY
Status: DISCONTINUED | OUTPATIENT
Start: 2024-04-24 | End: 2024-04-24 | Stop reason: HOSPADM

## 2024-04-23 RX ORDER — LAMOTRIGINE 100 MG/1
100 TABLET ORAL 2 TIMES DAILY
Status: DISCONTINUED | OUTPATIENT
Start: 2024-04-23 | End: 2024-04-24 | Stop reason: HOSPADM

## 2024-04-23 RX ORDER — OXYCODONE HYDROCHLORIDE 5 MG/1
5-10 TABLET ORAL EVERY 4 HOURS PRN
Qty: 20 TABLET | Refills: 0 | Status: SHIPPED | OUTPATIENT
Start: 2024-04-23

## 2024-04-23 RX ORDER — ONDANSETRON 2 MG/ML
4 INJECTION INTRAMUSCULAR; INTRAVENOUS EVERY 6 HOURS PRN
Status: DISCONTINUED | OUTPATIENT
Start: 2024-04-23 | End: 2024-04-24 | Stop reason: HOSPADM

## 2024-04-23 RX ORDER — PROPOFOL 10 MG/ML
INJECTION, EMULSION INTRAVENOUS PRN
Status: DISCONTINUED | OUTPATIENT
Start: 2024-04-23 | End: 2024-04-23

## 2024-04-23 RX ORDER — SODIUM CHLORIDE, SODIUM LACTATE, POTASSIUM CHLORIDE, CALCIUM CHLORIDE 600; 310; 30; 20 MG/100ML; MG/100ML; MG/100ML; MG/100ML
INJECTION, SOLUTION INTRAVENOUS CONTINUOUS
Status: DISCONTINUED | OUTPATIENT
Start: 2024-04-23 | End: 2024-04-23 | Stop reason: HOSPADM

## 2024-04-23 RX ORDER — VARENICLINE TARTRATE 0.5 MG/1
0.5 TABLET, FILM COATED ORAL 2 TIMES DAILY
Status: ON HOLD | COMMUNITY
End: 2024-04-23

## 2024-04-23 RX ORDER — NICOTINE POLACRILEX 4 MG
15-30 LOZENGE BUCCAL
Status: DISCONTINUED | OUTPATIENT
Start: 2024-04-23 | End: 2024-04-23

## 2024-04-23 RX ORDER — TESTOSTERONE GEL, 1% 10 MG/G
2 GEL TRANSDERMAL DAILY
Status: DISCONTINUED | OUTPATIENT
Start: 2024-04-24 | End: 2024-04-24 | Stop reason: HOSPADM

## 2024-04-23 RX ORDER — AMOXICILLIN 250 MG
1 CAPSULE ORAL DAILY PRN
Qty: 30 TABLET | Refills: 0 | Status: SHIPPED | OUTPATIENT
Start: 2024-04-23

## 2024-04-23 RX ORDER — FENTANYL CITRATE 50 UG/ML
25 INJECTION, SOLUTION INTRAMUSCULAR; INTRAVENOUS EVERY 5 MIN PRN
Status: DISCONTINUED | OUTPATIENT
Start: 2024-04-23 | End: 2024-04-23 | Stop reason: HOSPADM

## 2024-04-23 RX ORDER — ALBUTEROL SULFATE 90 UG/1
1-2 AEROSOL, METERED RESPIRATORY (INHALATION) EVERY 4 HOURS PRN
Status: DISCONTINUED | OUTPATIENT
Start: 2024-04-23 | End: 2024-04-24 | Stop reason: HOSPADM

## 2024-04-23 RX ORDER — FLUTICASONE PROPIONATE 50 MCG
2 SPRAY, SUSPENSION (ML) NASAL AT BEDTIME
Status: DISCONTINUED | OUTPATIENT
Start: 2024-04-23 | End: 2024-04-24 | Stop reason: HOSPADM

## 2024-04-23 RX ORDER — ACETAMINOPHEN 500 MG
500-1000 TABLET ORAL EVERY 4 HOURS PRN
Qty: 100 TABLET | Refills: 0 | Status: SHIPPED | OUTPATIENT
Start: 2024-04-23

## 2024-04-23 RX ORDER — OXYCODONE HYDROCHLORIDE 5 MG/1
5 TABLET ORAL EVERY 4 HOURS PRN
Status: DISCONTINUED | OUTPATIENT
Start: 2024-04-23 | End: 2024-04-24 | Stop reason: HOSPADM

## 2024-04-23 RX ORDER — FENTANYL CITRATE 50 UG/ML
50 INJECTION, SOLUTION INTRAMUSCULAR; INTRAVENOUS EVERY 5 MIN PRN
Status: DISCONTINUED | OUTPATIENT
Start: 2024-04-23 | End: 2024-04-23 | Stop reason: HOSPADM

## 2024-04-23 RX ORDER — DEXAMETHASONE SODIUM PHOSPHATE 10 MG/ML
INJECTION, SOLUTION INTRAMUSCULAR; INTRAVENOUS PRN
Status: DISCONTINUED | OUTPATIENT
Start: 2024-04-23 | End: 2024-04-23

## 2024-04-23 RX ORDER — IPRATROPIUM BROMIDE AND ALBUTEROL SULFATE 2.5; .5 MG/3ML; MG/3ML
1 SOLUTION RESPIRATORY (INHALATION) EVERY 6 HOURS PRN
Status: DISCONTINUED | OUTPATIENT
Start: 2024-04-23 | End: 2024-04-24 | Stop reason: HOSPADM

## 2024-04-23 RX ORDER — VARENICLINE TARTRATE 0.5 MG/1
1 TABLET, FILM COATED ORAL 2 TIMES DAILY
Status: DISCONTINUED | OUTPATIENT
Start: 2024-04-23 | End: 2024-04-24 | Stop reason: HOSPADM

## 2024-04-23 RX ORDER — ACETAMINOPHEN 325 MG/1
650 TABLET ORAL EVERY 4 HOURS PRN
Status: DISCONTINUED | OUTPATIENT
Start: 2024-04-26 | End: 2024-04-24 | Stop reason: HOSPADM

## 2024-04-23 RX ORDER — LIDOCAINE 40 MG/G
CREAM TOPICAL
Status: DISCONTINUED | OUTPATIENT
Start: 2024-04-23 | End: 2024-04-23 | Stop reason: HOSPADM

## 2024-04-23 RX ORDER — CLINDAMYCIN PHOSPHATE 10 UG/ML
LOTION TOPICAL 2 TIMES DAILY PRN
COMMUNITY
Start: 2023-12-08

## 2024-04-23 RX ORDER — BUPIVACAINE HYDROCHLORIDE AND EPINEPHRINE 2.5; 5 MG/ML; UG/ML
INJECTION, SOLUTION INFILTRATION; PERINEURAL PRN
Status: DISCONTINUED | OUTPATIENT
Start: 2024-04-23 | End: 2024-04-23 | Stop reason: HOSPADM

## 2024-04-23 RX ORDER — OXYCODONE HYDROCHLORIDE 5 MG/1
10 TABLET ORAL EVERY 4 HOURS PRN
Status: DISCONTINUED | OUTPATIENT
Start: 2024-04-23 | End: 2024-04-24 | Stop reason: HOSPADM

## 2024-04-23 RX ORDER — HYDROMORPHONE HCL IN WATER/PF 6 MG/30 ML
0.4 PATIENT CONTROLLED ANALGESIA SYRINGE INTRAVENOUS
Status: DISCONTINUED | OUTPATIENT
Start: 2024-04-23 | End: 2024-04-24 | Stop reason: HOSPADM

## 2024-04-23 RX ORDER — VENLAFAXINE HYDROCHLORIDE 150 MG/1
150 CAPSULE, EXTENDED RELEASE ORAL AT BEDTIME
Status: DISCONTINUED | OUTPATIENT
Start: 2024-04-23 | End: 2024-04-24 | Stop reason: HOSPADM

## 2024-04-23 RX ORDER — ATORVASTATIN CALCIUM 10 MG/1
20 TABLET, FILM COATED ORAL
Status: DISCONTINUED | OUTPATIENT
Start: 2024-04-23 | End: 2024-04-24 | Stop reason: HOSPADM

## 2024-04-23 RX ORDER — ONDANSETRON 2 MG/ML
4 INJECTION INTRAMUSCULAR; INTRAVENOUS EVERY 30 MIN PRN
Status: DISCONTINUED | OUTPATIENT
Start: 2024-04-23 | End: 2024-04-23 | Stop reason: HOSPADM

## 2024-04-23 RX ORDER — HALOPERIDOL 5 MG/ML
1 INJECTION INTRAMUSCULAR
Status: DISCONTINUED | OUTPATIENT
Start: 2024-04-23 | End: 2024-04-23 | Stop reason: HOSPADM

## 2024-04-23 RX ORDER — ONDANSETRON 4 MG/1
4 TABLET, ORALLY DISINTEGRATING ORAL EVERY 30 MIN PRN
Status: DISCONTINUED | OUTPATIENT
Start: 2024-04-23 | End: 2024-04-23 | Stop reason: HOSPADM

## 2024-04-23 RX ORDER — FENTANYL CITRATE 50 UG/ML
INJECTION, SOLUTION INTRAMUSCULAR; INTRAVENOUS PRN
Status: DISCONTINUED | OUTPATIENT
Start: 2024-04-23 | End: 2024-04-23

## 2024-04-23 RX ORDER — BISACODYL 10 MG
10 SUPPOSITORY, RECTAL RECTAL DAILY PRN
Status: DISCONTINUED | OUTPATIENT
Start: 2024-04-23 | End: 2024-04-24 | Stop reason: HOSPADM

## 2024-04-23 RX ADMIN — LAMOTRIGINE 100 MG: 100 TABLET ORAL at 21:08

## 2024-04-23 RX ADMIN — INSULIN GLARGINE 5 UNITS: 100 INJECTION, SOLUTION SUBCUTANEOUS at 21:09

## 2024-04-23 RX ADMIN — OXYCODONE 10 MG: 5 TABLET ORAL at 17:38

## 2024-04-23 RX ADMIN — HYDROMORPHONE HYDROCHLORIDE 0.4 MG: 0.2 INJECTION, SOLUTION INTRAMUSCULAR; INTRAVENOUS; SUBCUTANEOUS at 16:46

## 2024-04-23 RX ADMIN — FENTANYL CITRATE 50 MCG: 50 INJECTION, SOLUTION INTRAMUSCULAR; INTRAVENOUS at 13:29

## 2024-04-23 RX ADMIN — DEXMEDETOMIDINE 12 MCG: 100 INJECTION, SOLUTION, CONCENTRATE INTRAVENOUS at 12:28

## 2024-04-23 RX ADMIN — SUGAMMADEX 200 MG: 100 INJECTION, SOLUTION INTRAVENOUS at 12:56

## 2024-04-23 RX ADMIN — ATORVASTATIN CALCIUM 20 MG: 10 TABLET, FILM COATED ORAL at 16:44

## 2024-04-23 RX ADMIN — FENTANYL CITRATE 50 MCG: 50 INJECTION, SOLUTION INTRAMUSCULAR; INTRAVENOUS at 13:34

## 2024-04-23 RX ADMIN — SODIUM CHLORIDE, POTASSIUM CHLORIDE, SODIUM LACTATE AND CALCIUM CHLORIDE: 600; 310; 30; 20 INJECTION, SOLUTION INTRAVENOUS at 09:35

## 2024-04-23 RX ADMIN — ACETAMINOPHEN 975 MG: 325 TABLET ORAL at 16:44

## 2024-04-23 RX ADMIN — HYDROMORPHONE HYDROCHLORIDE 0.5 MG: 1 INJECTION, SOLUTION INTRAMUSCULAR; INTRAVENOUS; SUBCUTANEOUS at 10:58

## 2024-04-23 RX ADMIN — FLUTICASONE PROPIONATE 2 SPRAY: 50 SPRAY, METERED NASAL at 21:07

## 2024-04-23 RX ADMIN — VENLAFAXINE HYDROCHLORIDE 150 MG: 150 CAPSULE, EXTENDED RELEASE ORAL at 21:08

## 2024-04-23 RX ADMIN — HYDROMORPHONE HYDROCHLORIDE 0.4 MG: 0.2 INJECTION, SOLUTION INTRAMUSCULAR; INTRAVENOUS; SUBCUTANEOUS at 14:00

## 2024-04-23 RX ADMIN — MAGNESIUM SULFATE HEPTAHYDRATE 4 G: 4 INJECTION, SOLUTION INTRAVENOUS at 09:37

## 2024-04-23 RX ADMIN — Medication 2 G: at 10:23

## 2024-04-23 RX ADMIN — HYDROMORPHONE HYDROCHLORIDE 0.4 MG: 0.2 INJECTION, SOLUTION INTRAMUSCULAR; INTRAVENOUS; SUBCUTANEOUS at 14:07

## 2024-04-23 RX ADMIN — SODIUM CHLORIDE, POTASSIUM CHLORIDE, SODIUM LACTATE AND CALCIUM CHLORIDE: 600; 310; 30; 20 INJECTION, SOLUTION INTRAVENOUS at 13:30

## 2024-04-23 RX ADMIN — VARENICLINE TARTRATE 1 MG: 0.5 TABLET, FILM COATED ORAL at 21:08

## 2024-04-23 RX ADMIN — DEXMEDETOMIDINE 16 MCG: 100 INJECTION, SOLUTION, CONCENTRATE INTRAVENOUS at 10:46

## 2024-04-23 RX ADMIN — SENNOSIDES AND DOCUSATE SODIUM 1 TABLET: 8.6; 5 TABLET ORAL at 21:08

## 2024-04-23 RX ADMIN — INSULIN ASPART 2 UNITS: 100 INJECTION, SOLUTION INTRAVENOUS; SUBCUTANEOUS at 17:32

## 2024-04-23 RX ADMIN — ACETAMINOPHEN 975 MG: 325 TABLET ORAL at 09:28

## 2024-04-23 RX ADMIN — FENTANYL CITRATE 100 MCG: 50 INJECTION INTRAMUSCULAR; INTRAVENOUS at 10:26

## 2024-04-23 RX ADMIN — DEXAMETHASONE SODIUM PHOSPHATE 10 MG: 10 INJECTION, SOLUTION INTRAMUSCULAR; INTRAVENOUS at 10:41

## 2024-04-23 RX ADMIN — PROPOFOL 200 MG: 10 INJECTION, EMULSION INTRAVENOUS at 10:26

## 2024-04-23 RX ADMIN — ROCURONIUM BROMIDE 30 MG: 10 INJECTION, SOLUTION INTRAVENOUS at 10:56

## 2024-04-23 RX ADMIN — FENTANYL CITRATE 50 MCG: 50 INJECTION, SOLUTION INTRAMUSCULAR; INTRAVENOUS at 13:48

## 2024-04-23 RX ADMIN — HYDROMORPHONE HYDROCHLORIDE 0.5 MG: 1 INJECTION, SOLUTION INTRAMUSCULAR; INTRAVENOUS; SUBCUTANEOUS at 11:48

## 2024-04-23 RX ADMIN — METFORMIN HYDROCHLORIDE 1000 MG: 500 TABLET, FILM COATED ORAL at 17:00

## 2024-04-23 RX ADMIN — MIDAZOLAM 2 MG: 1 INJECTION INTRAMUSCULAR; INTRAVENOUS at 10:23

## 2024-04-23 RX ADMIN — LIDOCAINE HYDROCHLORIDE 40 MG: 10 INJECTION, SOLUTION INFILTRATION; PERINEURAL at 10:26

## 2024-04-23 RX ADMIN — HYDROMORPHONE HYDROCHLORIDE 0.4 MG: 0.2 INJECTION, SOLUTION INTRAMUSCULAR; INTRAVENOUS; SUBCUTANEOUS at 14:33

## 2024-04-23 RX ADMIN — HYDROMORPHONE HYDROCHLORIDE 0.4 MG: 0.2 INJECTION, SOLUTION INTRAMUSCULAR; INTRAVENOUS; SUBCUTANEOUS at 14:21

## 2024-04-23 RX ADMIN — ONDANSETRON 4 MG: 2 INJECTION INTRAMUSCULAR; INTRAVENOUS at 12:29

## 2024-04-23 RX ADMIN — SODIUM CHLORIDE, POTASSIUM CHLORIDE, SODIUM LACTATE AND CALCIUM CHLORIDE: 600; 310; 30; 20 INJECTION, SOLUTION INTRAVENOUS at 10:59

## 2024-04-23 RX ADMIN — HYDROMORPHONE HYDROCHLORIDE 0.4 MG: 0.2 INJECTION, SOLUTION INTRAMUSCULAR; INTRAVENOUS; SUBCUTANEOUS at 21:08

## 2024-04-23 RX ADMIN — FENTANYL CITRATE 50 MCG: 50 INJECTION, SOLUTION INTRAMUSCULAR; INTRAVENOUS at 13:43

## 2024-04-23 RX ADMIN — METHOCARBAMOL 750 MG: 750 TABLET, FILM COATED ORAL at 17:39

## 2024-04-23 RX ADMIN — ROCURONIUM BROMIDE 50 MG: 10 INJECTION, SOLUTION INTRAVENOUS at 10:26

## 2024-04-23 RX ADMIN — HYDROMORPHONE HYDROCHLORIDE 0.4 MG: 0.2 INJECTION, SOLUTION INTRAMUSCULAR; INTRAVENOUS; SUBCUTANEOUS at 13:54

## 2024-04-23 ASSESSMENT — ACTIVITIES OF DAILY LIVING (ADL)
ADLS_ACUITY_SCORE: 24
ADLS_ACUITY_SCORE: 24
ADLS_ACUITY_SCORE: 22
ADLS_ACUITY_SCORE: 23
ADLS_ACUITY_SCORE: 22
ADLS_ACUITY_SCORE: 24
ADLS_ACUITY_SCORE: 22
ADLS_ACUITY_SCORE: 21
ADLS_ACUITY_SCORE: 23
ADLS_ACUITY_SCORE: 22
ADLS_ACUITY_SCORE: 23
ADLS_ACUITY_SCORE: 22
ADLS_ACUITY_SCORE: 24

## 2024-04-23 ASSESSMENT — COPD QUESTIONNAIRES: COPD: 1

## 2024-04-23 NOTE — ANESTHESIA POSTPROCEDURE EVALUATION
Patient: Rogelio Belle    Procedure: Procedure(s):  LUMBAR 4- SACRAL 1 BILATERAL MEDIAL FACETECTOMY AND DECOMPRESSION       Anesthesia Type:  General    Note:  Disposition: Inpatient   Postop Pain Control: Uneventful            Sign Out: Well controlled pain   PONV: No   Neuro/Psych: Uneventful            Sign Out: Acceptable/Baseline neuro status   Airway/Respiratory: Uneventful            Sign Out: Acceptable/Baseline resp. status; O2 supplementation               Oxygen: Nasal Cannula   CV/Hemodynamics: Uneventful            Sign Out: Acceptable CV status; No obvious hypovolemia; No obvious fluid overload   Other NRE: NONE   DID A NON-ROUTINE EVENT OCCUR? No    Event details/Postop Comments:  Elevated ETCO2 due to COPD. Stable at PACU discharge.       Last vitals:  Vitals Value Taken Time   /75 04/23/24 1440   Temp 36.7  C (98  F) 04/23/24 1316   Pulse 74 04/23/24 1442   Resp 10 04/23/24 1441   SpO2 97 % 04/23/24 1442   Vitals shown include unfiled device data.    Electronically Signed By: Waldemar Candelario MD  April 23, 2024  3:01 PM

## 2024-04-23 NOTE — ANESTHESIA CARE TRANSFER NOTE
Patient: Rogelio Belle    Procedure: Procedure(s):  LUMBAR 4- SACRAL 1 BILATERAL MEDIAL FACETECTOMY AND DECOMPRESSION       Diagnosis: Neurogenic claudication [R29.818]  Spinal stenosis [M48.00]  Diagnosis Additional Information: No value filed.    Anesthesia Type:   General     Note:    Oropharynx: oropharynx clear of all foreign objects  Level of Consciousness: drowsy  Oxygen Supplementation: face mask  Level of Supplemental Oxygen (L/min / FiO2): 10  Independent Airway: airway patency satisfactory and stable  Dentition: dentition unchanged  Vital Signs Stable: post-procedure vital signs reviewed and stable    Patient transferred to: PACU    Handoff Report: Identifed the Patient, Identified the Reponsible Provider, Reviewed the pertinent medical history, Discussed the surgical course, Reviewed Intra-OP anesthesia mangement and issues during anesthesia, Set expectations for post-procedure period and Allowed opportunity for questions and acknowledgement of understanding    Vitals:  Vitals Value Taken Time   /82 04/23/24 1317   Temp 36.7  C (98  F) 04/23/24 1316   Pulse 90 04/23/24 1320   Resp 35 04/23/24 1320   SpO2 100 % 04/23/24 1320   Vitals shown include unfiled device data.    Electronically Signed By: SCHUYLER Jeronimo CRNA  April 23, 2024  1:21 PM

## 2024-04-23 NOTE — PROGRESS NOTES
[facility] Orthopedic Brief Operative Note    Pre-operative diagnosis: Neurogenic claudication [R29.818]  Spinal stenosis [M48.00]   Post-operative diagnosis: L4-S1 spinal stenosis   Procedure: Bilateral L4-5, L5-S1 medial facetectomy and decompression   Surgeon: Claude Resendez   Assistant(s): WINSOME Reis PA-C   Anesthesia: General endotracheal anesthesia   Estimated blood loss: 50 cc   Total IV fluids: (See anesthesia record)   Blood transfusion: No transfusion was given during surgery   Total urine output: (See anesthesia record)   Drains: Hemovac   Specimens: None   Implants: None   Findings: L4-S1 spinal stenosis   Complications: None   Condition: Stable   Weight bearing status: Weight bearing as tolerated   Activity: Activity as tolerated  Patient may move about with assist as indicated or with supervision   Orthotic management: Not applicable   Comments: See dictated operative report for full details     Goldy Romero PA-C  [facility]    Postop Cares:    - May discharge once cleared by internal medicine and HV drain removed  - Remove HV drain once output <30 ml x 2 consecutive 8 hour shifts  - Discharge postop meds sent to home pharmacy on file: oxycodone, acetaminophen and Senna-S

## 2024-04-23 NOTE — PLAN OF CARE
Problem: Adult Inpatient Plan of Care  Goal: Absence of Hospital-Acquired Illness or Injury  Intervention: Identify and Manage Fall Risk  Recent Flowsheet Documentation  Taken 4/23/2024 1700 by Jannie Magaña RN  Safety Promotion/Fall Prevention:   safety round/check completed   room organization consistent   room near nurse's station   room door open   clutter free environment maintained   assistive device/personal items within reach   Goal Outcome Evaluation:      Plan of Care Reviewed With: patient    Overall Patient Progress: improvingOverall Patient Progress: improving  Patient vital signs are at baseline: Yes  Patient able to ambulate as they were prior to admission or with assist devices provided by therapies during their stay:  No,  Reason:  due to ambulate  Patient MUST void prior to discharge:  No,  Reason:  due to void  Patient able to tolerate oral intake:  Yes  Pain has adequate pain control using Oral analgesics:  Yes  Does patient have an identified :  Yes  Has goal D/C date and time been discussed with patient:  Yes   Remains alert and oriented, vss on 3 liters, can voice needs, pain meds given, due to ambulate  but has voided one more PVR left. Has a hemovac drain in place.

## 2024-04-23 NOTE — PLAN OF CARE
Problem: Spinal Surgery  Goal: Absence of Bleeding  Outcome: Progressing     Problem: Spinal Surgery  Goal: Anesthesia/Sedation Recovery  Intervention: Optimize Anesthesia Recovery  Recent Flowsheet Documentation  Taken 4/23/2024 1500 by Andrew Johnson RN  Administration (IS): instruction provided, initial   Goal Outcome Evaluation:       Pt lethargic, opens eyes spontaneously. Pt orientated to room, call light, and phone. VSS /59   Pulse 75   Temp 98.1  F (36.7  C) (Oral)   Resp 10   Wt 121.6 kg (268 lb)   SpO2 93%   BMI 38.45 kg/m   on 4L with capnography per report ETCO2 35-61, Christiane LANDA for evening aware. Pt received IV dilaudid prior to transport, now reports pain 6/10. Most recent , bladder scan 383cc. Oncoming RN to follow up.

## 2024-04-23 NOTE — PROVIDER NOTIFICATION
Notified [facility] due to pts uncontrolled pain despite multiple pain medication administration. Provider aware. To come see pt. Blood pressure and respiratory rate both coming down with pain medications even though pt keeps saying 8/10 pain. Endorses to taking oxy 10mg at home x4hrs.

## 2024-04-23 NOTE — ANESTHESIA PREPROCEDURE EVALUATION
Anesthesia Pre-Procedure Evaluation    Patient: Rogelio Belle   MRN: [id number] : 1970        Procedure : Procedure(s):  LUMBAR 4- SACRAL 1 BILATERAL MEDIAL FACETECTOMY AND DECOMPRESSION          Past Medical History:   Diagnosis Date     COPD (chronic obstructive pulmonary disease) (H)      Diabetes (H)      Hypertension       Past Surgical History:   Procedure Laterality Date     ARTHROSCOPY KNEE WITH MENISCECTOMY Left      BACK SURGERY        No Known Allergies   Social History     Tobacco Use     Smoking status: Former     Current packs/day: 1.00     Average packs/day: 1 pack/day for 24.3 years (24.3 ttl pk-yrs)     Types: Cigarettes     Start date:      Smokeless tobacco: Not on file   Substance Use Topics     Alcohol use: Not Currently      Wt Readings from Last 1 Encounters:   24 121.6 kg (268 lb)        Anesthesia Evaluation   Pt has had prior anesthetic.         ROS/MED HX  ENT/Pulmonary:     (+)                          COPD,              Neurologic:  - neg neurologic ROS     Cardiovascular:     (+)  hypertension- -   -  - -                                      METS/Exercise Tolerance:     Hematologic:  - neg hematologic  ROS     Musculoskeletal:  - neg musculoskeletal ROS     GI/Hepatic:  - neg GI/hepatic ROS     Renal/Genitourinary:  - neg Renal ROS     Endo:     (+)  type II DM,   Using insulin,          Obesity,       Psychiatric/Substance Use:     (+) psychiatric history depression and anxiety alcohol abuse      Infectious Disease:  - neg infectious disease ROS     Malignancy:  - neg malignancy ROS     Other:  - neg other ROS        Physical Exam    Airway  airway exam normal      Mallampati: I   TM distance: > 3 FB   Neck ROM: full   Mouth opening: > 3 cm    Respiratory Devices and Support         Dental  no notable dental history     (+) Edentulous      Cardiovascular   cardiovascular exam normal       Rhythm and rate: regular and normal     Pulmonary   pulmonary exam normal      "   breath sounds clear to auscultation       OUTSIDE LABS:  CBC:   Lab Results   Component Value Date    WBC 7.3 07/13/2023    WBC 9.2 07/13/2023    HGB 11.9 (L) 07/13/2023    HGB 12.7 (L) 07/13/2023    HCT 35.0 (L) 07/13/2023    HCT 37.0 (L) 07/13/2023     07/13/2023     07/13/2023     BMP:   Lab Results   Component Value Date     (L) 07/13/2023     (L) 07/13/2023    POTASSIUM 3.6 07/13/2023    POTASSIUM 3.4 (L) 07/13/2023    CHLORIDE 89 (L) 07/13/2023    CHLORIDE 84 (L) 07/13/2023    CO2 27 07/13/2023    CO2 30 07/13/2023    BUN 13 07/13/2023    BUN 13 07/13/2023    CR 0.77 07/13/2023    CR 0.79 07/13/2023     (H) 07/13/2023     (H) 07/13/2023     COAGS: No results found for: \"PTT\", \"INR\", \"FIBR\"  POC: No results found for: \"BGM\", \"HCG\", \"HCGS\"  HEPATIC:   Lab Results   Component Value Date    ALBUMIN 3.1 (L) 07/13/2023    PROTTOTAL 6.7 07/13/2023    ALT 18 07/13/2023    AST 25 07/13/2023    ALKPHOS 58 07/13/2023    BILITOTAL 0.5 07/13/2023     OTHER:   Lab Results   Component Value Date    LACT 2.9 (H) 07/13/2023    AKBAR 7.8 (L) 07/13/2023    MAG 2.2 07/13/2023       Anesthesia Plan    ASA Status:  3    NPO Status:  NPO Appropriate    Anesthesia Type: General.     - Airway: ETT   Induction: Intravenous, Propofol.   Maintenance: Balanced.        Consents    Anesthesia Plan(s) and associated risks, benefits, and realistic alternatives discussed. Questions answered and patient/representative(s) expressed understanding.     - Discussed:     - Discussed with:  Patient      - Extended Intubation/Ventilatory Support Discussed: Yes.      - Patient is DNR/DNI Status: No     Use of blood products discussed: Yes.     - Discussed with: Patient.     Postoperative Care    Pain management: Multi-modal analgesia.   PONV prophylaxis: Ondansetron (or other 5HT-3), Dexamethasone or Solumedrol     Comments:             Waldemar Candelario MD    I have reviewed the pertinent notes and labs in the " chart from the past 30 days and (re)examined the patient.  Any updates or changes from those notes are reflected in this note.                   3L O2 NC/aspiration precautions/cardiac precautions/fall precautions/oxygen therapy device and L/min

## 2024-04-23 NOTE — PHARMACY-ADMISSION MEDICATION HISTORY
Pharmacist ELYSSA Medication History    Admission medication history is complete. The information provided in this note is only as accurate as the sources available at the time of the update.    Medication reconciliation/reorder completed by provider prior to medication history? No    Information Source(s): Patient and Clinic records and Care Everywhere via in-person    Pertinent Information: N/A    Allergies reviewed with patient and updates made in EHR: yes    Medications available for use during hospital stay: inhalers.      Medication History Completed By: Nolan Rodriguez [facility] 4/23/2024 9:37 AM    PTA Med List   Medication Sig Last Dose    ADVAIR DISKUS 250-50 MCG/ACT inhaler Inhale 1 puff into the lungs 2 times daily as needed (shortness of breath) Unknown at has with    amphetamine-dextroamphetamine (ADDERALL) 30 MG tablet Take 30 mg by mouth 2 times daily Morning and afternoon 4/22/2024 at 1400    atorvastatin (LIPITOR) 20 MG tablet Take 20 mg by mouth daily (with lunch) 4/22/2024 at 1200    clindamycin (CLEOCIN T) 1 % external lotion Apply topically 2 times daily as needed Unknown    fluticasone (FLONASE) 50 MCG/ACT nasal spray Spray 2 sprays into both nostrils At Bedtime 4/22/2024 at PM    gabapentin (NEURONTIN) 300 MG capsule Take 1,200 mg by mouth 3 times daily Patient has been on this dose for over one year 4/23/2024 at AM x1    ipratropium - albuterol 0.5 mg/2.5 mg/3 mL (DUONEB) 0.5-2.5 (3) MG/3ML neb solution Take 1 vial (3 mLs) by nebulization every 6 hours as needed for shortness of breath, wheezing or cough Unknown    lamoTRIgine (LAMICTAL) 100 MG tablet Take 100 mg by mouth 2 times daily 4/22/2024 at PM    LANTUS SOLOSTAR 100 UNIT/ML soln Inject 5 Units Subcutaneous At Bedtime 4/22/2024 at PM    lisinopril-hydrochlorothiazide (ZESTORETIC) 10-12.5 MG tablet Take 1 tablet by mouth daily 4/22/2024 at AM    metFORMIN (GLUCOPHAGE) 1000 MG tablet Take 1,000 mg by mouth 2 times daily (with meals)  4/22/2024 at PM    oxyCODONE-acetaminophen (PERCOCET)  MG per tablet Take 1 tablet by mouth every 4 hours as needed for pain 4/23/2024 at 0730    tamsulosin (FLOMAX) 0.4 MG capsule Take 0.8 mg by mouth daily (with lunch) 4/22/2024 at 1200    testosterone (ANDROGEL 1 % PUMP) 12.5 MG/ACT (1%) gel Place 2 Pump onto the skin daily Shoulder/upper arm. 4/21/2024    varenicline (CHANTIX) 1 MG tablet Take 1 tablet by mouth 2 times daily 4/22/2024 at PM    venlafaxine (EFFEXOR XR) 150 MG 24 hr capsule Take 150 mg by mouth At Bedtime 4/22/2024 at PM    VENTOLIN  (90 Base) MCG/ACT inhaler Inhale 1-2 puffs into the lungs every 4 hours as needed Unknown at has with

## 2024-04-23 NOTE — PROCEDURES
PREOPERATIVE DIAGNOSES:  1.         L4-5, L5-S1 spinal stenosis with neurogenic claudication.  2.         Failure of conservative management.      POSTOPERATIVE DIAGNOSES:  1.         L4-5, L5-S1 spinal stenosis with neurogenic claudication.  2.         Failure of conservative management.      PROCEDURES PERFORMED:  1.         Left L4-5 and L5-S1 laminotomy.  2.         Bilateral L4-5, L5-J2hrbcwd facetectomies and bilateral lateral recess decompression.         ATTENDING SURGEON:  Claude Resendez M.D.      FIRST ASSISTANT:  Karlo Romero PA-C.  Karlo Romero PA-C was particularly important in the performance of the procedure including patient positioning, soft tissue retraction, and successful performance of the decompression procedure as well as closure.      ESTIMATED BLOOD LOSS:  50 cc.     COMPLICATIONS:  None.     DETAILS OF PROCEDURE/INTRODUCTION:  The patient is a very pleasant 63 year-old male patient who came to the clinic with a longstanding history of low back pain and leg pain  going into both calves, consistent with imaging studies showing severe  spinal stenosis. The patient had exhausted nonoperative measures and continued to have debilitating symptoms. Therefore, I had a discussion with him regarding surgery and specifically discussed the risks including but not limited to death, infection, dural tear, nerve injury, and nonresolution of symptoms, among others, and he accepted and wished to proceed.      The patient was brought to the operating room and placed under general endotracheal anesthetic without complications. Antibiotics were given intravenously within 1 hour of incision. He was then placed prone on the Brad table ensuring that all nerve areas and bony areas were padded and free of pressure. The low back area was then prepped and draped in routine sterile manner. After the appropriate timeout, I made an incision over the L4, L5 and S1  spinous processes and carried out a  subperiosteal dissection on the left side to expose the interlaminar spaces. I took intraoperative imaging to confirm my levels. I then started on the L4-5 level on the left side. I used a nancy to nancy down the medial descending facet of L4. I used a Kerrison rongeur to perform a laminotomy and medial facetectomy all the way to the medial border of the L4 and L5 pedicles on the left side. I then, through the same laminotomy, performed a contralateral lateral recess decompression and medial facetectomy all the way to the medial border of the L4  and L5 pedicles on the right side. I then went distally  one level at the  L5-S1  level and again performed the same procedure of the left side with an L5-S1 laminotomy and medial facetectomy with lateral recess decompression. I then performed a contralateral medial facetectomy.  I then performed copious irrigation and hemostasis. I performed closure of the fascia utilizing Vicryl 0 in a continuous fashion over a subfascial drain.  I then closed the subcutaneous tissue layer utilizing Vicryl 2-0. Vicryl 3-0 was used for the skin. Marcaine 0.5% with epinephrine was then injected into the skin and subcutaneous tissues. Steri-Strips were applied followed by a sterile dressing. The patient was then placed supine on her bed and subsequently extubated without complications and brought to the recovery room in satisfactory condition.      POSTOPERATIVE PLAN:  The patient is to mobilize as tolerated.  After discharge, the patient will be seen back in clinic in 2 days for drain removal.

## 2024-04-23 NOTE — H&P
I have reviewed the pertinent notes and labs in the chart from the past 30 days and examined the patient.  There are no significant changes.

## 2024-04-23 NOTE — CONSULTS
[facility]  Consult Note - Hospitalist Service  Date of Admission:  4/23/2024  Consult Requested by: Orthopedics  Reason for Consult: Postop medical management    Assessment & Plan   Rogelio Belle is a 53 year old male admitted on 4/23/2024. He has a past medical history significant for chronic pain, asthma, type 2 diabetes on insulin, hypertension, dyslipidemia, and history of adjustment disorder with depressed mood presented to [facility] for elective spinal surgery is now postop hospital medicine was consulted for medical comanagement.    S/P Spinal Surgery  -Pain control, antiemetics, DVT prophylaxis, therapies, dispo per surgery    History of COPD/asthma  -PTA inhaled medications  -PTA Flonase    T2DM  -PTA glargine  -PTA metformin  -Sliding scale insulin    Hypertension  -PTA Zestoretic with holding parameters    Dyslipidemia  -PTA atorvastatin    History of adjustment disorder with depressed mood  -PTA venlafaxine  -PTA Lamictal    History of chronic pain  -Holding PTA oxycodone and gabapentin defer to primary    Tobacco use disorder  -PTA Chantix  -As needed Nicorette gum           Clinically Significant Risk Factors Present on Admission                  # Hypertension: Noted on problem list                 Dale Connelly MD  Hospitalist Service  Securely message with [employer] (more info)  Text page via [facility] Paging/Directory   ______________________________________________________________________    Chief Complaint   Postop    History is obtained from the patient    History of Present Illness   Rogelio Belle is a 53 year old male admitted on 4/23/2024. He has a past medical history significant for chronic pain, asthma, type 2 diabetes on insulin, hypertension, dyslipidemia, and history of adjustment disorder with depressed mood presented to [facility] for elective spinal surgery is now postop hospital medicine was consulted for medical comanagement.  Evaluated patient at bedside in  the PACU introduced myself explained the role of hospital medicine managing his chronic comorbidities and site of the surgery informed him that surgery still manages his postoperative care including his pain medications as well as his DVT prophylaxis and therapy/disposition orders.  Patient denies any questions or concerns at this time, he is otherwise feeling well outside of postop aside from some pain      Past Medical History    Past Medical History:   Diagnosis Date    COPD (chronic obstructive pulmonary disease) (H)     Diabetes (H)     Hypertension        Past Surgical History   Past Surgical History:   Procedure Laterality Date    ARTHROSCOPY KNEE WITH MENISCECTOMY Left     BACK SURGERY         Medications   I have reviewed this patient's current medications          Physical Exam   Vital Signs: Temp: 98  F (36.7  C) Temp src: Temporal BP: (!) 144/76 Pulse: 71   Resp: 11 SpO2: 95 % O2 Device: Nasal cannula Oxygen Delivery: 4 LPM  Weight: 268 lbs 0 oz    Gen: Appears well, NAD, awake, appears comfortable, sitting upright in bed  Card:Warm well perfused, pulses assumed patient talking  Pulm: Normal I/E effort on RA  Abd:Non distended  Skin:No obvious rashes or lesions on exposed areas of skin  Neuro AxOx4, S/S grossly intact, no obvious FND,   Psych:Pleasant, answering questions appropriately, insight good, judgement good, does not appear to be responding to I/E stimuli     Medical Decision Making       40 MINUTES SPENT BY ME on the date of service doing chart review, history, exam, documentation & further activities per the note.      Data   ------------------------- PAST 24 HR DATA REVIEWED -----------------------------------------------        Imaging results reviewed over the past 24 hrs:   Recent Results (from the past 24 hour(s))   XR Surgery AURELIO  Fluoro G/T 5 Min    Narrative    This exam was marked as non-reportable because it will not be read by a   radiologist or a [place] non-radiologist  provider.

## 2024-04-23 NOTE — ANESTHESIA PROCEDURE NOTES
Airway       Patient location during procedure: OR       Procedure Start/Stop Times: 4/23/2024 10:30 AM  Staff -        CRNA: Tracie Calloway APRN CRNA       Performed By: CRNA  Consent for Airway        Urgency: elective  Indications and Patient Condition       Indications for airway management: alber-procedural       Induction type:intravenous       Mask difficulty assessment: 2 - vent by mask + OA or adjuvant +/- NMBA    Final Airway Details       Final airway type: endotracheal airway       Successful airway: ETT - single  Endotracheal Airway Details        ETT size (mm): 8.0       Cuffed: yes       Successful intubation technique: direct laryngoscopy       DL Blade Type: Sharp 2       Grade View of Cords: 1       Adjucts: stylet       Position: Right       Measured from: lips       Secured at (cm): 23       Bite block used: Soft    Post intubation assessment        Placement verified by: capnometry and equal breath sounds        Number of attempts at approach: 1       Number of other approaches attempted: 0       Secured with: tape       Ease of procedure: easy       Dentition: Unchanged    Medication(s) Administered   Medication Administration Time: 4/23/2024 10:30 AM

## 2024-04-24 ENCOUNTER — APPOINTMENT (OUTPATIENT)
Dept: PHYSICAL THERAPY | Facility: CLINIC | Age: 54
End: 2024-04-24
Attending: PHYSICIAN ASSISTANT
Payer: COMMERCIAL

## 2024-04-24 VITALS
OXYGEN SATURATION: 97 % | BODY MASS INDEX: 38.45 KG/M2 | WEIGHT: 268 LBS | DIASTOLIC BLOOD PRESSURE: 58 MMHG | TEMPERATURE: 97.6 F | RESPIRATION RATE: 18 BRPM | HEART RATE: 64 BPM | SYSTOLIC BLOOD PRESSURE: 117 MMHG

## 2024-04-24 LAB — GLUCOSE BLDC GLUCOMTR-MCNC: 119 MG/DL (ref 70–99)

## 2024-04-24 PROCEDURE — 250N000013 HC RX MED GY IP 250 OP 250 PS 637: Performed by: STUDENT IN AN ORGANIZED HEALTH CARE EDUCATION/TRAINING PROGRAM

## 2024-04-24 PROCEDURE — 82962 GLUCOSE BLOOD TEST: CPT

## 2024-04-24 PROCEDURE — 97116 GAIT TRAINING THERAPY: CPT | Mod: GP

## 2024-04-24 PROCEDURE — 250N000013 HC RX MED GY IP 250 OP 250 PS 637: Performed by: PHYSICIAN ASSISTANT

## 2024-04-24 PROCEDURE — 97161 PT EVAL LOW COMPLEX 20 MIN: CPT | Mod: GP

## 2024-04-24 PROCEDURE — 99214 OFFICE O/P EST MOD 30 MIN: CPT | Performed by: STUDENT IN AN ORGANIZED HEALTH CARE EDUCATION/TRAINING PROGRAM

## 2024-04-24 PROCEDURE — 250N000013 HC RX MED GY IP 250 OP 250 PS 637

## 2024-04-24 RX ORDER — METHOCARBAMOL 750 MG/1
750 TABLET, FILM COATED ORAL 4 TIMES DAILY
Qty: 60 TABLET | Refills: 0 | Status: SHIPPED | OUTPATIENT
Start: 2024-04-24

## 2024-04-24 RX ORDER — METHOCARBAMOL 500 MG/1
500 TABLET, FILM COATED ORAL 4 TIMES DAILY
Status: DISCONTINUED | OUTPATIENT
Start: 2024-04-24 | End: 2024-04-24

## 2024-04-24 RX ORDER — METHOCARBAMOL 750 MG/1
750 TABLET, FILM COATED ORAL 4 TIMES DAILY
Status: DISCONTINUED | OUTPATIENT
Start: 2024-04-24 | End: 2024-04-24 | Stop reason: HOSPADM

## 2024-04-24 RX ADMIN — ATORVASTATIN CALCIUM 20 MG: 10 TABLET, FILM COATED ORAL at 11:06

## 2024-04-24 RX ADMIN — OXYCODONE 10 MG: 5 TABLET ORAL at 11:06

## 2024-04-24 RX ADMIN — ACETAMINOPHEN 975 MG: 325 TABLET ORAL at 00:40

## 2024-04-24 RX ADMIN — ACETAMINOPHEN 975 MG: 325 TABLET ORAL at 07:54

## 2024-04-24 RX ADMIN — METHOCARBAMOL 750 MG: 750 TABLET, FILM COATED ORAL at 08:39

## 2024-04-24 RX ADMIN — OXYCODONE 10 MG: 5 TABLET ORAL at 00:40

## 2024-04-24 RX ADMIN — VARENICLINE TARTRATE 1 MG: 0.5 TABLET, FILM COATED ORAL at 07:54

## 2024-04-24 RX ADMIN — LAMOTRIGINE 100 MG: 100 TABLET ORAL at 07:55

## 2024-04-24 RX ADMIN — POLYETHYLENE GLYCOL 3350 17 G: 17 POWDER, FOR SOLUTION ORAL at 07:56

## 2024-04-24 RX ADMIN — LISINOPRIL AND HYDROCHLOROTHIAZIDE 1 TABLET: 12.5; 1 TABLET ORAL at 07:55

## 2024-04-24 RX ADMIN — OXYCODONE 10 MG: 5 TABLET ORAL at 06:46

## 2024-04-24 RX ADMIN — SENNOSIDES AND DOCUSATE SODIUM 1 TABLET: 8.6; 5 TABLET ORAL at 07:55

## 2024-04-24 RX ADMIN — TAMSULOSIN HYDROCHLORIDE 0.8 MG: 0.4 CAPSULE ORAL at 11:06

## 2024-04-24 RX ADMIN — METFORMIN HYDROCHLORIDE 1000 MG: 500 TABLET, FILM COATED ORAL at 07:54

## 2024-04-24 ASSESSMENT — ACTIVITIES OF DAILY LIVING (ADL)
ADLS_ACUITY_SCORE: 24

## 2024-04-24 NOTE — PROGRESS NOTES
Pt and family understand discharge instructions as evidence by nodding, asking questions.     Pt and family understand responsible adult role and responsibilities.     Iv removed.     Ambulates steady, safely.

## 2024-04-24 NOTE — PROGRESS NOTES
04/24/24 1000   Appointment Info   Signing Clinician's Name / Credentials (PT) Zack Dickerson, PT, DPT   Quick Adds   Quick Adds Certification   Living Environment   People in Home spouse   Current Living Arrangements house   Home Accessibility stairs to enter home   Number of Stairs, Main Entrance 6   Self-Care   Usual Activity Tolerance good   Current Activity Tolerance moderate   Equipment Currently Used at Home none   General Information   Onset of Illness/Injury or Date of Surgery 04/23/24   Referring Physician Dr. Claude Resendez   Patient/Family Therapy Goals Statement (PT) Decrease pain with mobility   Pertinent History of Current Problem (include personal factors and/or comorbidities that impact the POC) s/p Lumbar surgery   Existing Precautions/Restrictions spinal;weight bearing   Weight-Bearing Status - LLE weight-bearing as tolerated   Weight-Bearing Status - RLE weight-bearing as tolerated   Range of Motion (ROM)   ROM Comment WFL, decreased lumbar region due to pain and precautions   Strength (Manual Muscle Testing)   Strength Comments WFL   Transfers   Transfers sit-stand transfer   Sit-Stand Transfer   Sit-Stand [place] (Transfers) contact guard   Assistive Device (Sit-Stand Transfers) walker, front-wheeled   Gait/Stairs (Locomotion)   [place] Level (Gait) contact guard   Assistive Device (Gait) walker, front-wheeled   Distance in Feet (Gait) 10'   Pattern (Gait) step-through   Deviations/Abnormal Patterns (Gait) raj decreased;stride length decreased   Clinical Impression   Criteria for Skilled Therapeutic Intervention Yes, treatment indicated   PT Diagnosis (PT) impaired functional mobility   Influenced by the following impairments weakness, pain   Functional limitations due to impairments gait, stairs, transfers   Clinical Presentation (PT Evaluation Complexity) stable   Clinical Presentation Rationale pt presents as medically diagnosed   Clinical Decision Making (Complexity) low  complexity   Planned Therapy Interventions (PT) gait training;home exercise program;patient/family education;ROM (range of motion);stair training;strengthening;transfer training   Risk & Benefits of therapy have been explained care plan/treatment goals reviewed;patient   PT Total Evaluation Time   PT Eval, Low Complexity Minutes (38953) 10   Therapy Certification   Start of care date 04/24/24   Certification date from 04/24/24   Certification date to 05/24/24   Medical Diagnosis s/p lumbar surgery   Physical Therapy Goals   PT Frequency One time eval and treatment only   PT Predicted Duration/Target Date for Goal Attainment 04/24/24   PT Goals Transfers;Gait;Stairs   PT: Transfers Modified independent;Sit to/from stand;Assistive device;Goal Met   PT: Gait Modified independent;Rolling walker;50 feet;Goal Met   PT: Stairs Supervision/stand-by assist;4 stairs;Rail on both sides;Goal Met   Interventions   Interventions Quick Adds Therapeutic Activity;Gait Training   Therapeutic Activity   Therapeutic Activities: dynamic activities to improve functional performance Minutes (74950) 5   Symptoms Noted During/After Treatment Fatigue   Treatment Detail/Skilled Intervention sit to/from stand CGA, cueing for safe hand placement, Mod I following education, reviewed spinal precautions   Gait Training   Gait Training Minutes (86442) 10   Symptoms Noted During/After Treatment (Gait Training) fatigue;increased pain   Treatment Detail/Skilled Intervention Pt amb to the stairs and back, slowly, CGA with FWW, seated rest break prior to navigating 4 stairs up/down with bilat HRs and non-reciprocal technique. No LOB or adverse s/s other than high pain levels. Educated on walking program, icing, POC, role of therapies, log roll technique.   Distance in Feet 40' x 2   [place] Level (Gait Training) contact guard   Physical Assistance Level (Gait Training) supervision;verbal cues   Weight Bearing (Gait Training) weight-bearing as  tolerated   Assistive Device (Gait Training) rolling walker   Pattern Analysis (Gait Training) swing-through gait   Gait Analysis Deviations decreased raj;decreased step length   Impairments (Gait Analysis/Training) pain;strength decreased   PT Discharge Planning   PT Plan d/c PT   PT Discharge Recommendation (DC Rec) home with assist   PT Rationale for DC Rec Pt amb and transferring well, family to assist as needed, no mobility concerns with d/c.   PT Brief overview of current status SBA transfers and amb 80' with FWW   [facility]  OUTPATIENT PHYSICAL THERAPY EVALUATION  PLAN OF TREATMENT FOR OUTPATIENT REHABILITATION  (COMPLETE FOR INITIAL CLAIMS ONLY)  Patient's Last Name, First Name, M.I.  YOB: 1970  LyRoeglio haney  [place]OLA                        Provider's Name  [facility] Medical Record No.  [id number]                             Onset Date:  04/23/24   Start of Care Date:  04/24/24   Type:     _X_PT   ___OT   ___SLP Medical Diagnosis:  s/p lumbar surgery              PT Diagnosis:  impaired functional mobility Visits from SOC:  1     See note for plan of treatment, functional goals and certification details    I CERTIFY THE NEED FOR THESE SERVICES FURNISHED UNDER        THIS PLAN OF TREATMENT AND WHILE UNDER MY CARE     (Physician co-signature of this document indicates review and certification of the therapy plan).               Physical Therapy Discharge Summary    Reason for therapy discharge:    Discharged to home.  All goals and outcomes met, no further needs identified.    Progress towards therapy goal(s). See goals on Care Plan in [facility] electronic health record for goal details.  Goals met    Therapy recommendation(s):    Continue home exercise program.

## 2024-04-24 NOTE — PROGRESS NOTES
Care Management Discharge Note    Discharge Date: 04/24/2024       Discharge Disposition: Home    Discharge Services: None    Discharge DME: None    Discharge Transportation:      Private pay costs discussed: Not applicable    Does the patient's insurance plan have a 3 day qualifying hospital stay waiver?  No    PAS Confirmation Code:    Patient/family educated on Medicare website which has current facility and service quality ratings: no    Education Provided on the Discharge Plan: Yes (AVS per bedside RN)  Persons Notified of Discharge Plans: pt  Patient/Family in Agreement with the Plan: yes    Handoff Referral Completed: Yes    Additional Information:  Pt to discharge back to home with family/friend to transport.     Pt to follow-up in clinic with Surgeon Team    No CM needs for discharge.     Delon Prado RN

## 2024-04-24 NOTE — PROGRESS NOTES
"     Orthopedic Progress Note        Assessment: 1 Day Post-Op  S/P Procedure: Left L4-5 and L5-S1 laminotomy. Bilateral L4-5, L5-G1ostefh facetectomies and bilateral lateral recess decompression     Plan:   - Continue PT/OT  - Weightbearing status: WBAT can use brace for comfort  - No bending, twisting or lifting more than 10 pounds for 6 weeks.  - Drain removed   - Anticoagulation: no chemical prophylaxis in addition to SCDs, anupama stockings and early ambulation.  - Pain control at discharge: Oxycodone 5 mg 1-2 tabs Q4-6 hours x30-32 tabs, Hydroxyzine 25 mg QID PRN, Gabapentin 100 mg TID, Tizanidine 4 mg TID, Acetaminophen 1000 mg TID  - Discharge planning:  pain control and progression in therapy        Subjective:  Pain: mild  Chest pain, SOB: no  Nausea, Vomiting:  no  Lightheadedness, Dizziness:  no  Neuro:  Patient denies new onset numbness or paresthesias     Patient is doing well this morning notes that his pain is moderately controlled on the oral pain medications.  I did order him Robaxin.  Drain was removed this morning.  Has not yet worked with therapy but is ambulating, tolerating oral intake voiding and passing gas without difficulty.  Pending therapy and pain control could possibly go home at around lunchtime today     Objective:  /56 (BP Location: Right arm, Patient Position: Chair, Cuff Size: Adult Large)   Pulse 72   Temp 98.5  F (36.9  C) (Oral)   Resp 18   Wt 121.6 kg (268 lb)   SpO2 96%   BMI 38.45 kg/m     The patient is A&Ox3. Appears comfortable.   Sensation is intact.  Dorsiflexion and plantar flexion is intact.  Dorsalis pedis pulse intact.  Calves are soft and non-tender. Negative Sergio's.  The incision is covered. Dressing C/D/I.  Drain removed       Pertinent Labs   Lab Results: personally reviewed.   No results found for: \"INR\", \"PROTIME\"  Lab Results   Component Value Date    WBC 7.3 07/13/2023    HGB 11.9 (L) 07/13/2023    HCT 35.0 (L) 07/13/2023    MCV 85 07/13/2023    PLT " 190 07/13/2023     Lab Results   Component Value Date     (L) 07/13/2023    CO2 27 07/13/2023            Report completed by:  Jayda Barber PA-C/Dr. Resendez  [facility]     Date: 4/24/2024  Time: 8:37 AM

## 2024-04-24 NOTE — PROGRESS NOTES
[facility]    Medicine Progress Note - Hospitalist Service    Date of Admission:  4/23/2024    Assessment & Plan   Rogelio Belle is a 53 year old male admitted on 4/23/2024. He has a past medical history significant for chronic pain, asthma, type 2 diabetes on insulin, hypertension, dyslipidemia, and history of adjustment disorder with depressed mood presented to [facility] for elective spinal surgery is now postop hospital medicine was consulted for medical comanagement.    Changes today:  -Medically stable for discharge, pending ortho clearance    S/P Spinal Surgery  -Pain control, antiemetics, DVT prophylaxis, therapies, dispo per surgery    History of COPD/asthma  -PTA inhaled medications  -PTA Flonase    T2DM  -PTA glargine  -PTA metformin  -Sliding scale insulin    Hypertension  -PTA Zestoretic with holding parameters    Dyslipidemia  -PTA atorvastatin    History of adjustment disorder with depressed mood  -PTA venlafaxine  -PTA Lamictal    History of chronic pain  -Holding PTA oxycodone and gabapentin defer to primary    Tobacco use disorder  -PTA Chantix  -As needed Nicorette gum    ADHD  -Holding PTA med, pt. Agreeable to this              Diet: Regular Diet Adult    DVT Prophylaxis: Defer to primary service  Scott Catheter: Not present  Lines: None     Cardiac Monitoring: None  Code Status: Full Code      Clinically Significant Risk Factors Present on Admission                  # Hypertension: Noted on problem list                 Disposition Plan     Medically Ready for Discharge: Ready Now             Dale Connelly MD  Hospitalist Service  [facility]  Securely message with [employer] (more info)  Text page via [employer] Paging/Directory   ______________________________________________________________________    Interval History   No acute events overnight, discussed continuing to hold his Adderall if patient is agreeable to this has no other questions or  concerns.    Physical Exam   Vital Signs: Temp: 97.6  F (36.4  C) Temp src: Oral BP: 117/58 Pulse: 64   Resp: 18 SpO2: 97 % O2 Device: Nasal cannula Oxygen Delivery: 1 LPM  Weight: 268 lbs 0 oz    Gen: Appears well, NAD, on RA  Card:Warm well perfused, pulses assumed patient talking  Pulm: Normal I/E effort on RA  Abd:Non distended  Skin:No obvious rashes or lesions on exposed areas of skin  Neuro AxOx4, S/S grossly intact, no obvious FND,   Psych:Pleasant, answering questions appropriately, insight good, judgement good, does not appear to be responding to I/E stimuli     Medical Decision Making       30 MINUTES SPENT BY ME on the date of service doing chart review, history, exam, documentation & further activities per the note.      Data   ------------------------- PAST 24 HR DATA REVIEWED -----------------------------------------------        Imaging results reviewed over the past 24 hrs:   Recent Results (from the past 24 hour(s))   XR Surgery AURELIO  Fluoro G/T 5 Min    Narrative    This exam was marked as non-reportable because it will not be read by a   radiologist or a [place] non-radiologist provider.

## 2024-04-24 NOTE — PLAN OF CARE
Problem: Adult Inpatient Plan of Care  Goal: Optimal Comfort and Wellbeing  Intervention: Monitor Pain and Promote Comfort   Goal Outcome Evaluation:  Patient vital signs are at baseline: Yes  Patient able to ambulate as they were prior to admission or with assist devices provided by therapies during their stay:  Yes  Patient MUST void prior to discharge:  Yes  Patient able to tolerate oral intake:  Yes  Pain has adequate pain control using Oral analgesics:  Yes  Does patient have an identified :  Yes  Has goal D/C date and time been discussed with patient:  Yes   Patient is alert and oriented X 4. Slept in the recliner. Scheduled Tylenol and PRN Oxycodone administered for pain control. Utilized ice packs for comfort. Ambulated to the bathroom and voided. IV saline locked. Oxygen via NC. VSS. Call light within reach.

## 2024-04-24 NOTE — DISCHARGE SUMMARY
ORTHOPEDIC DISCHARGE SUMMARY       Rogelio Belle,  1970, MRN [id number]    Admission Date: 2024      Admission Diagnoses: Neurogenic claudication [R29.818]  Spinal stenosis [M48.00]     Discharge Date:  24     Post-operative Day:  1 Day Post-Op    Reason for Admission: The patient was admitted for the following: Procedure(s):  LUMBAR 4- SACRAL 1 BILATERAL MEDIAL FACETECTOMY AND DECOMPRESSION    BRIEF HOSPITAL COURSE   Rogelio Belle is a pleasant 53 year old male who underwent the aforementioned procedure with Dr. Resendez on . There were no intraoperative complications and the patient was transferred to the recovery room and later the orthopedic unit in stable condition. Once the patient reached the orthopedic floor our orthopedic pain protocol was implemented along with the following:    Anticoagulation Medications: None  Therapy: PT and OT  Activity: WBAT  Bracing: None    Consultations during Admission: Hospitalist service for medical management     COMPLICATIONS/SIGNIFICANT FINDINGS    NONE    DISCHARGE INFORMATION   Condition at discharge: Good  Discharge destination: Home  Patient was seen by myself on the date of discharge.    FOLLOW UP CARE   Follow up with orthopedics in 2 weeks or sooner should the need arise. Ortho will continue to manage pain control, post op anticoagulation and incision care.     Follow up with your PCP for management of chronic medical problems and to evaluate for post op medical complications including constipation, nausea/vomiting, DVT/PE, anemia, changes in blood pressure, fevers/chills, urinary retention and atelectasis/pneumonia.     Subjective   Patient is doing well on POD #1. Pain is well controlled with oral medications. Ambulating. Tolerating oral intake.     Physical Exam   /58 (BP Location: Right arm)   Pulse 64   Temp 97.6  F (36.4  C) (Oral)   Resp 18   Wt 121.6 kg (268 lb)   SpO2 97%   BMI 38.45 kg/m    The patient is A&Ox3. Appears  comfortable.   Sensation is intact.  Dorsiflexion and plantar flexion is intact.  Dorsalis pedis pulse intact.  Calves are soft and non-tender. Negative Sergio's.  The incision is covered. Dressing C/D/I.  Drain removed    Pertinent Results at Discharge     Hemoglobin   Date/Time Value Ref Range Status   07/13/2023 06:27 AM 11.9 (L) 13.3 - 17.7 g/dL Final   07/13/2023 01:56 AM 12.7 (L) 13.3 - 17.7 g/dL Final   12/24/2022 03:59 AM 13.0 (L) 13.3 - 17.7 g/dL Final     Platelet Count   Date/Time Value Ref Range Status   07/13/2023 06:27  150 - 450 10e3/uL Final   07/13/2023 01:56  150 - 450 10e3/uL Final   12/24/2022 03:59  150 - 450 10e3/uL Final       Problem List   Active Problems:    Lumbar spinal stenosis      Jayda Barber PA-C/Dr. Resendez  [facility]  [phone]  Date: 4/24/2024  Time: 11:01 AM

## 2024-07-21 ENCOUNTER — HEALTH MAINTENANCE LETTER (OUTPATIENT)
Age: 54
End: 2024-07-21

## 2024-08-01 NOTE — PLAN OF CARE
Goal Outcome Evaluation:      Problem: Plan of Care - These are the overarching goals to be used throughout the patient stay.    Goal: Optimal Comfort and Wellbeing  Intervention: Monitor Pain and Promote Comfort  Recent Flowsheet Documentation  Taken 7/13/2023 0655 by Mansi Hull RN  Pain Management Interventions:    medication (see MAR)    declines     Problem: Pain Acute  Goal: Optimal Pain Control and Function  Outcome: Progressing  Intervention: Develop Pain Management Plan  Recent Flowsheet Documentation  Taken 7/13/2023 0655 by Mansi Hull RN  Pain Management Interventions:    medication (see MAR)    declines  Intervention: Prevent or Manage Pain  Recent Flowsheet Documentation  Taken 7/13/2023 0700 by Mansi Hull RN  Medication Review/Management: medications reviewed     Pt arrived on unit at 0655 hours. VSS with 3 liters NC. Denied any pain. SBA. LS are dimished with expiratory wheezing.   Sputum sample was collected down in ED, urine sample still needs to be obtain. Pt stated that he was unable to produce a sample d/t voiding in the ED prior to coming up. Will follow up.   
Goal Outcome Evaluation:  Patient alert and oriented. Up SBA ambulated in room to bathroom. Voiding spontaneously. VSS. LS diminished with some expiratory wheezes. Complained of chronic back pain. Available pain medication given patient reported decrease in pain. On Tele SR with BBB. On diabetic diet.  and 278 this shift. On sliding scale insulin.   Report to 2N RN transfer patient to room 236 for continuation of care.     Addendum:   At about 1500 while getting ready to transfer patient. Patient stated he would like to leave AMA. Writer advised patient that he should complete course of his treatment. MD notified and was at bedside advising patient.   Patient declined and decided to leave against medical advise.   2N RN and charge nurse notified of changes.    PIV and Tele removed patient left the unit.       Problem: Pain Acute  Goal: Optimal Pain Control and Function  Outcome: Progressing     Problem: Pulmonary Impairment  Goal: Optimal Gas Exchange  Outcome: Progressing       
head injury

## 2024-12-08 ENCOUNTER — HEALTH MAINTENANCE LETTER (OUTPATIENT)
Age: 54
End: 2024-12-08

## 2025-03-15 ENCOUNTER — HEALTH MAINTENANCE LETTER (OUTPATIENT)
Age: 55
End: 2025-03-15

## 2025-06-29 ENCOUNTER — HEALTH MAINTENANCE LETTER (OUTPATIENT)
Age: 55
End: 2025-06-29

## (undated) DEVICE — SUTURE VICRYL+ 3-0 18IN PS-2 UND VCP497H

## (undated) DEVICE — CUSTOM PACK LUMBAR FUSION SNE5BLFHEA

## (undated) DEVICE — GLOVE SURG PI ULTRA TOUCH M SZ 7 LF 42670

## (undated) DEVICE — SUTURE VICRYL+ 2-0 27IN CT-1 UND VCP259H

## (undated) DEVICE — CUSHION INSERT LG PRONE VIEW JACKSON TABLE

## (undated) DEVICE — DRAPE STERI TOWEL LG 1010

## (undated) DEVICE — SUCTION MANIFOLD NEPTUNE 2 SYS 1 PORT 702-025-000

## (undated) DEVICE — SUTURE VICRYL+ 0 27IN CT-1 UND VCP260H

## (undated) DEVICE — ESU PENCIL SMOKE EVAC W/ROCKER SWITCH 0703-047-000

## (undated) DEVICE — DRSG DRAIN 4X4" 7086

## (undated) DEVICE — SOL NACL 0.9% IRRIG 1000ML BOTTLE 2F7124

## (undated) DEVICE — WRAP B-COOL TERI LUMBAR 08143380

## (undated) DEVICE — SOL WATER IRRIG 1000ML BOTTLE 2F7114

## (undated) DEVICE — POSITIONER ARM CRADLE LAMINECTOMY DISP

## (undated) DEVICE — DRAPE C-ARM 60X42" 1013

## (undated) DEVICE — GLOVE UNDER INDICATOR PI SZ 7.0 LF 41670

## (undated) DEVICE — Device

## (undated) DEVICE — DRSG STERI STRIP 1/2X4" R1547

## (undated) DEVICE — SYR 30ML LL W/O NDL 302832

## (undated) DEVICE — DRSG ADAPTIC 3X8" 6113

## (undated) DEVICE — GLOVE BIOGEL PI INDICATOR 8.0 LF 41680

## (undated) DEVICE — PACK 9X6IN THRP HOT COLD CMPR  MED GEL 80104

## (undated) DEVICE — KIT DRAIN CLOSED WOUND SUCTION MED 400ML RESVR

## (undated) DEVICE — TOOL DISSECT MIDAS MR8 14CM MATCH HEAD 3MM MR8-14MH30

## (undated) DEVICE — SUTURE VICRYL+ 2-0 CT-2 27" UND VCP269H

## (undated) DEVICE — RX SURGIFLO HEMOSTATIC MATRIX 8ML 2991

## (undated) DEVICE — DRSG GAUZE 4X4" TRAY 6939

## (undated) DEVICE — GLOVE SURG PI ULTRA TOUCH M SZ 8 LF

## (undated) DEVICE — CATH IV 14GA 2IN REM FLASHPLUG DEHP-FR PVC FR 4251717-02

## (undated) DEVICE — ELECTRODE PATIENT RETURN ADULT L10 FT 2 PLATE CORD 0855C

## (undated) DEVICE — DECANTER VIAL 2006S

## (undated) DEVICE — DRSG ABD TNDRSRB WET PRUF 8IN X 10IN STRL  9194A

## (undated) RX ORDER — DEXAMETHASONE SODIUM PHOSPHATE 10 MG/ML
INJECTION, EMULSION INTRAMUSCULAR; INTRAVENOUS
Status: DISPENSED
Start: 2024-04-23

## (undated) RX ORDER — FENTANYL CITRATE 50 UG/ML
INJECTION, SOLUTION INTRAMUSCULAR; INTRAVENOUS
Status: DISPENSED
Start: 2024-04-23

## (undated) RX ORDER — ONDANSETRON 2 MG/ML
INJECTION INTRAMUSCULAR; INTRAVENOUS
Status: DISPENSED
Start: 2024-04-23

## (undated) RX ORDER — LIDOCAINE HYDROCHLORIDE 10 MG/ML
INJECTION, SOLUTION EPIDURAL; INFILTRATION; INTRACAUDAL; PERINEURAL
Status: DISPENSED
Start: 2024-04-23

## (undated) RX ORDER — PROPOFOL 10 MG/ML
INJECTION, EMULSION INTRAVENOUS
Status: DISPENSED
Start: 2024-04-23